# Patient Record
Sex: MALE | Race: WHITE
[De-identification: names, ages, dates, MRNs, and addresses within clinical notes are randomized per-mention and may not be internally consistent; named-entity substitution may affect disease eponyms.]

---

## 2017-04-21 ENCOUNTER — HOSPITAL ENCOUNTER (OUTPATIENT)
Dept: HOSPITAL 17 - ELAB | Age: 77
End: 2017-04-21
Attending: ORTHOPAEDIC SURGERY
Payer: MEDICARE

## 2017-04-21 DIAGNOSIS — M06.4: ICD-10-CM

## 2017-04-21 DIAGNOSIS — N40.1: Primary | ICD-10-CM

## 2017-04-21 LAB
BASOPHILS # BLD AUTO: 0.1 TH/MM3 (ref 0–0.2)
BASOPHILS NFR BLD: 0.8 % (ref 0–2)
EOSINOPHIL # BLD: 0.5 TH/MM3 (ref 0–0.4)
EOSINOPHIL NFR BLD: 5.3 % (ref 0–4)
ERYTHROCYTE [DISTWIDTH] IN BLOOD BY AUTOMATED COUNT: 12.8 % (ref 11.6–17.2)
ERYTHROCYTE [SEDIMENTATION RATE] IN BLOOD BY WESTERGREN METHOD: 47 MM/HR (ref 0–20)
HCT VFR BLD CALC: 40 % (ref 39–51)
HEMO FLAGS: (no result)
LYMPHOCYTES # BLD AUTO: 1.8 TH/MM3 (ref 1–4.8)
LYMPHOCYTES NFR BLD AUTO: 19 % (ref 9–44)
MCH RBC QN AUTO: 30.1 PG (ref 27–34)
MCHC RBC AUTO-ENTMCNC: 33.4 % (ref 32–36)
MCV RBC AUTO: 90 FL (ref 80–100)
MONOCYTES NFR BLD: 11.3 % (ref 0–8)
NEUTROPHILS # BLD AUTO: 6.1 TH/MM3 (ref 1.8–7.7)
NEUTROPHILS NFR BLD AUTO: 63.6 % (ref 16–70)
PLATELET # BLD: 296 TH/MM3 (ref 150–450)
RBC # BLD AUTO: 4.44 MIL/MM3 (ref 4.5–5.9)
URATE SERPL-MCNC: 3.6 MG/DL (ref 2.6–7.2)
WBC # BLD AUTO: 9.6 TH/MM3 (ref 4–11)

## 2017-04-21 PROCEDURE — 86140 C-REACTIVE PROTEIN: CPT

## 2017-04-21 PROCEDURE — 84153 ASSAY OF PSA TOTAL: CPT

## 2017-04-21 PROCEDURE — 36415 COLL VENOUS BLD VENIPUNCTURE: CPT

## 2017-04-21 PROCEDURE — 84550 ASSAY OF BLOOD/URIC ACID: CPT

## 2017-04-21 PROCEDURE — 85652 RBC SED RATE AUTOMATED: CPT

## 2017-04-21 PROCEDURE — 85025 COMPLETE CBC W/AUTO DIFF WBC: CPT

## 2017-12-05 ENCOUNTER — HOSPITAL ENCOUNTER (INPATIENT)
Dept: HOSPITAL 17 - NEPC | Age: 77
LOS: 10 days | Discharge: HOME HEALTH | DRG: 920 | End: 2017-12-15
Attending: FAMILY MEDICINE | Admitting: FAMILY MEDICINE
Payer: MEDICARE

## 2017-12-05 VITALS
HEART RATE: 65 BPM | SYSTOLIC BLOOD PRESSURE: 184 MMHG | RESPIRATION RATE: 15 BRPM | OXYGEN SATURATION: 99 % | DIASTOLIC BLOOD PRESSURE: 88 MMHG

## 2017-12-05 VITALS
HEART RATE: 73 BPM | RESPIRATION RATE: 16 BRPM | TEMPERATURE: 96.7 F | OXYGEN SATURATION: 99 % | DIASTOLIC BLOOD PRESSURE: 80 MMHG | SYSTOLIC BLOOD PRESSURE: 153 MMHG

## 2017-12-05 VITALS
OXYGEN SATURATION: 99 % | HEART RATE: 68 BPM | DIASTOLIC BLOOD PRESSURE: 81 MMHG | RESPIRATION RATE: 16 BRPM | SYSTOLIC BLOOD PRESSURE: 175 MMHG

## 2017-12-05 VITALS — HEIGHT: 72 IN | WEIGHT: 195.99 LBS | BODY MASS INDEX: 26.55 KG/M2

## 2017-12-05 DIAGNOSIS — K80.50: ICD-10-CM

## 2017-12-05 DIAGNOSIS — I10: ICD-10-CM

## 2017-12-05 DIAGNOSIS — K91.840: Primary | ICD-10-CM

## 2017-12-05 DIAGNOSIS — Z87.891: ICD-10-CM

## 2017-12-05 DIAGNOSIS — E78.5: ICD-10-CM

## 2017-12-05 DIAGNOSIS — K64.8: ICD-10-CM

## 2017-12-05 DIAGNOSIS — K57.30: ICD-10-CM

## 2017-12-05 DIAGNOSIS — Z95.1: ICD-10-CM

## 2017-12-05 DIAGNOSIS — K21.9: ICD-10-CM

## 2017-12-05 DIAGNOSIS — E87.6: ICD-10-CM

## 2017-12-05 DIAGNOSIS — D64.9: ICD-10-CM

## 2017-12-05 DIAGNOSIS — I25.2: ICD-10-CM

## 2017-12-05 DIAGNOSIS — Z88.8: ICD-10-CM

## 2017-12-05 DIAGNOSIS — K63.5: ICD-10-CM

## 2017-12-05 DIAGNOSIS — I25.10: ICD-10-CM

## 2017-12-05 DIAGNOSIS — Z79.82: ICD-10-CM

## 2017-12-05 DIAGNOSIS — K63.3: ICD-10-CM
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
## 2017-12-05 DIAGNOSIS — Z91.040: ICD-10-CM

## 2017-12-05 DIAGNOSIS — K82.8: ICD-10-CM

## 2017-12-05 LAB
ANION GAP SERPL CALC-SCNC: 6 MEQ/L (ref 5–15)
BASOPHILS # BLD AUTO: 0.1 TH/MM3 (ref 0–0.2)
BASOPHILS NFR BLD: 1 % (ref 0–2)
BUN SERPL-MCNC: 10 MG/DL (ref 7–18)
CHLORIDE SERPL-SCNC: 103 MEQ/L (ref 98–107)
EOSINOPHIL # BLD: 0.5 TH/MM3 (ref 0–0.4)
EOSINOPHIL NFR BLD: 7.4 % (ref 0–4)
ERYTHROCYTE [DISTWIDTH] IN BLOOD BY AUTOMATED COUNT: 13 % (ref 11.6–17.2)
GFR SERPLBLD BASED ON 1.73 SQ M-ARVRAT: 93 ML/MIN (ref 89–?)
HCO3 BLD-SCNC: 29.9 MEQ/L (ref 21–32)
HCT VFR BLD CALC: 46.2 % (ref 39–51)
HEMO FLAGS: (no result)
INR PPP: 1 RATIO
LYMPHOCYTES # BLD AUTO: 3.1 TH/MM3 (ref 1–4.8)
LYMPHOCYTES NFR BLD AUTO: 41.7 % (ref 9–44)
MCH RBC QN AUTO: 30.9 PG (ref 27–34)
MCHC RBC AUTO-ENTMCNC: 34 % (ref 32–36)
MCV RBC AUTO: 91 FL (ref 80–100)
MONOCYTES NFR BLD: 9.8 % (ref 0–8)
NEUTROPHILS # BLD AUTO: 3 TH/MM3 (ref 1.8–7.7)
NEUTROPHILS NFR BLD AUTO: 40.1 % (ref 16–70)
PLATELET # BLD: 288 TH/MM3 (ref 150–450)
POTASSIUM SERPL-SCNC: 4.1 MEQ/L (ref 3.5–5.1)
PROTHROMBIN TIME: 10 SEC (ref 9.8–11.6)
RBC # BLD AUTO: 5.08 MIL/MM3 (ref 4.5–5.9)
SODIUM SERPL-SCNC: 139 MEQ/L (ref 136–145)
WBC # BLD AUTO: 7.4 TH/MM3 (ref 4–11)

## 2017-12-05 PROCEDURE — 87493 C DIFF AMPLIFIED PROBE: CPT

## 2017-12-05 PROCEDURE — 82150 ASSAY OF AMYLASE: CPT

## 2017-12-05 PROCEDURE — 78278 ACUTE GI BLOOD LOSS IMAGING: CPT

## 2017-12-05 PROCEDURE — 86850 RBC ANTIBODY SCREEN: CPT

## 2017-12-05 PROCEDURE — 71111 X-RAY EXAM RIBS/CHEST4/> VWS: CPT

## 2017-12-05 PROCEDURE — 85018 HEMOGLOBIN: CPT

## 2017-12-05 PROCEDURE — 86901 BLOOD TYPING SEROLOGIC RH(D): CPT

## 2017-12-05 PROCEDURE — 85025 COMPLETE CBC W/AUTO DIFF WBC: CPT

## 2017-12-05 PROCEDURE — 74330 X-RAY BILE/PANC ENDOSCOPY: CPT

## 2017-12-05 PROCEDURE — 80048 BASIC METABOLIC PNL TOTAL CA: CPT

## 2017-12-05 PROCEDURE — 80076 HEPATIC FUNCTION PANEL: CPT

## 2017-12-05 PROCEDURE — P9016 RBC LEUKOCYTES REDUCED: HCPCS

## 2017-12-05 PROCEDURE — 80053 COMPREHEN METABOLIC PANEL: CPT

## 2017-12-05 PROCEDURE — 86900 BLOOD TYPING SEROLOGIC ABO: CPT

## 2017-12-05 PROCEDURE — 85014 HEMATOCRIT: CPT

## 2017-12-05 PROCEDURE — 86920 COMPATIBILITY TEST SPIN: CPT

## 2017-12-05 PROCEDURE — 74181 MRI ABDOMEN W/O CONTRAST: CPT

## 2017-12-05 PROCEDURE — 74177 CT ABD & PELVIS W/CONTRAST: CPT

## 2017-12-05 PROCEDURE — 82378 CARCINOEMBRYONIC ANTIGEN: CPT

## 2017-12-05 PROCEDURE — 36430 TRANSFUSION BLD/BLD COMPNT: CPT

## 2017-12-05 PROCEDURE — 85027 COMPLETE CBC AUTOMATED: CPT

## 2017-12-05 PROCEDURE — 85007 BL SMEAR W/DIFF WBC COUNT: CPT

## 2017-12-05 PROCEDURE — A9560 TC99M LABELED RBC: HCPCS

## 2017-12-05 PROCEDURE — 85610 PROTHROMBIN TIME: CPT

## 2017-12-05 PROCEDURE — 76377 3D RENDER W/INTRP POSTPROCES: CPT

## 2017-12-05 PROCEDURE — 83690 ASSAY OF LIPASE: CPT

## 2017-12-05 PROCEDURE — 93005 ELECTROCARDIOGRAM TRACING: CPT

## 2017-12-05 NOTE — PD
HPI


Chief Complaint:  GI Complaint


Time Seen by Provider:  22:25


Travel History


International Travel<30 days:  No


Contact w/Intl Traveler<30days:  No


Traveled to known affect area:  No





History of Present Illness


HPI


77-year-old male came to the emergency room with history of hematochezia that 

started at 6:45 PM.  Patient says that he had 10 polyps taken out during a 

colonoscopy about one week ago by Dr. Talavera.  He started having some abdominal 

discomfort 2 days later and was started on ciprofloxacin.  However when the 

bleeding started today he called the GI office but did not hear back from 

anybody.  In next one hour he had 3 more such episodes.  Patient describes 

these bleeding as big clots being passed.  History of point tenderness.  Just 

generalized discomfort in the abdomen but no point tenderness.  No history of 

vomiting or nausea.  Vital signs were stable.  Patient denies any weakness or 

dizziness.  No syncopal episode.





PFSH


Past Medical History


*** Narrative Medical


List of his past medical, surgical, social and family history is reviewed from 

the nursing note.


Cardiac Catheterization:  Yes


High Cholesterol:  Yes


Coronary Artery Disease:  Yes


Hypertension:  Yes


Immunizations Current:  Yes


Myocardial Infarction:  Yes (x 2)


Tetanus Vaccination:  Unknown


Influenza Vaccination:  Yes





Past Surgical History


Coronary Artery Bypass Graft:  Yes


Genitourinary Surgery:  Yes (turp)


Other Surgery:  Yes (pacemaker)





Social History


Alcohol Use:  No


Tobacco Use:  No


Substance Use:  No





Allergies-Medications


(Allergen,Severity, Reaction):  


Coded Allergies:  


     hydromorphone (Unverified  Allergy, Severe, HALLUCINATIONS; AGITATION, 12/5 /17)


     latex (Unverified  Adverse Reaction, Severe, SKIN IRRITATION, 12/5/17)


     metoprolol (Unverified  Adverse Reaction, Severe, FORGETS, BUT AVOIDS, 12/5 /17)


Comments


List of his allergies reviewed from the nursing note.


Reported Meds & Prescriptions





Reported Meds & Active Scripts


Active


Reported


Carvedilol 6.25 Mg Tab 6.25 Mg PO TID


Prevacid (Lansoprazole) 30 Mg Capdr 30 Mg PO DAILY


Vytorin (Ezetimibe-Simvastatin) 10-20 Mg Tab 1 Tab PO HS





Narrative Medication


List of his home medications reviewed from the nursing note.





Review of Systems


Except as stated in HPI:  all other systems reviewed are Neg


Gastrointestinal:  Positive: Hematochezia





Physical Exam


Narrative


GENERAL: Awake, alert, anxious, mild distress


SKIN: Focused skin assessment warm/dry.


HEAD: Atraumatic. Normocephalic. 


EYES: Pupils equal and round. No scleral icterus. No injection or drainage. 


ENT: No nasal bleeding or discharge.  Mucous membranes pink and moist.


NECK: Trachea midline. No JVD. 


CARDIOVASCULAR: Regular rate and rhythm.  No murmur appreciated.


RESPIRATORY: No accessory muscle use. Clear to auscultation. Breath sounds 

equal bilaterally. 


GASTROINTESTINAL: Abdomen soft, non-tender, nondistended. Hepatic and splenic 

margins not palpable. 


MUSCULOSKELETAL: No obvious deformities. No clubbing.  No cyanosis.  No edema. 


NEUROLOGICAL: Awake and alert. No obvious cranial nerve deficits.  Motor 

grossly within normal limits. Normal speech.


PSYCHIATRIC: Appropriate mood and affect; insight and judgment normal.





Data


Data


Last Documented VS





Orders





 Orders


Basic Metabolic Panel (Bmp) (12/5/17 23:04)


Complete Blood Count With Diff (12/5/17 23:04)


Prothrombin Time / Inr (Pt) (12/5/17 23:04)


Type And Screen (12/5/17 23:04)


Ecg Monitoring (12/5/17 23:04)


Iv Access Insert/Monitor (12/5/17 23:04)


Oximetry (12/5/17 23:04)


Sodium Chlor 0.9% 1000 Ml Inj (Ns 1000 M (12/5/17 23:04)


Sodium Chloride 0.9% Flush (Ns Flush) (12/5/17 23:15)


Admit Order (Ed Use Only) (12/6/17 00:03)





Labs





Laboratory Tests








Test


  12/5/17


23:20


 


White Blood Count 7.4 TH/MM3 


 


Red Blood Count 5.08 MIL/MM3 


 


Hemoglobin 15.7 GM/DL 


 


Hematocrit 46.2 % 


 


Mean Corpuscular Volume 91.0 FL 


 


Mean Corpuscular Hemoglobin 30.9 PG 


 


Mean Corpuscular Hemoglobin


Concent 34.0 % 


 


 


Red Cell Distribution Width 13.0 % 


 


Platelet Count 288 TH/MM3 


 


Mean Platelet Volume 8.7 FL 


 


Neutrophils (%) (Auto) 40.1 % 


 


Lymphocytes (%) (Auto) 41.7 % 


 


Monocytes (%) (Auto) 9.8 % 


 


Eosinophils (%) (Auto) 7.4 % 


 


Basophils (%) (Auto) 1.0 % 


 


Neutrophils # (Auto) 3.0 TH/MM3 


 


Lymphocytes # (Auto) 3.1 TH/MM3 


 


Monocytes # (Auto) 0.7 TH/MM3 


 


Eosinophils # (Auto) 0.5 TH/MM3 


 


Basophils # (Auto) 0.1 TH/MM3 


 


CBC Comment DIFF FINAL 


 


Differential Comment  


 


Prothrombin Time 10.0 SEC 


 


Prothromb Time International


Ratio 1.0 RATIO 


 


 


Blood Urea Nitrogen 10 MG/DL 


 


Creatinine 0.81 MG/DL 


 


Random Glucose 92 MG/DL 


 


Calcium Level 8.8 MG/DL 


 


Sodium Level 139 MEQ/L 


 


Potassium Level 4.1 MEQ/L 


 


Chloride Level 103 MEQ/L 


 


Carbon Dioxide Level 29.9 MEQ/L 


 


Anion Gap 6 MEQ/L 


 


Estimat Glomerular Filtration


Rate 93 ML/MIN 


 











MDM


Medical Decision Making


Medical Screen Exam Complete:  Yes


Emergency Medical Condition:  Yes


Medical Record Reviewed:  Yes


Differential Diagnosis


Lower GI bleed


Narrative Course


12:08 AM blood test results of back and H&H are stable.  I have put a call out 

for GI for Dr. Talavera or whoever is covering for him.  I have not heard back 

yet.  I have admitted the patient to his primary care physician.  Patient is 

getting 1 L of IV fluid bolus.





12:27 AM I discussed the case with the GI specialist who was on call and he 

recommended octreotide bolus and a drip which has been ordered.





Procedures


EKG Prior to Arrival:  No





HemaPrompt Point of Care


Internal Pos. & Neg. Controls:  Passed


Fecal Specimen Occult Blood:  Positive


Comment


Gross blood on the gloved finger





Diagnosis





 Primary Impression:  


 Hematochezia





Admitting Information


Admitting Physician Requests:  Admit











Rachelle Rosario MD Dec 5, 2017 22:26

## 2017-12-06 VITALS
RESPIRATION RATE: 17 BRPM | SYSTOLIC BLOOD PRESSURE: 147 MMHG | TEMPERATURE: 97.9 F | OXYGEN SATURATION: 96 % | HEART RATE: 74 BPM | DIASTOLIC BLOOD PRESSURE: 70 MMHG

## 2017-12-06 VITALS
DIASTOLIC BLOOD PRESSURE: 67 MMHG | HEART RATE: 75 BPM | RESPIRATION RATE: 12 BRPM | OXYGEN SATURATION: 97 % | SYSTOLIC BLOOD PRESSURE: 151 MMHG

## 2017-12-06 VITALS
DIASTOLIC BLOOD PRESSURE: 75 MMHG | SYSTOLIC BLOOD PRESSURE: 134 MMHG | OXYGEN SATURATION: 96 % | HEART RATE: 81 BPM | TEMPERATURE: 98.3 F | RESPIRATION RATE: 18 BRPM

## 2017-12-06 VITALS
OXYGEN SATURATION: 94 % | SYSTOLIC BLOOD PRESSURE: 109 MMHG | HEART RATE: 72 BPM | TEMPERATURE: 98.5 F | DIASTOLIC BLOOD PRESSURE: 56 MMHG | RESPIRATION RATE: 18 BRPM

## 2017-12-06 VITALS
TEMPERATURE: 97.7 F | DIASTOLIC BLOOD PRESSURE: 87 MMHG | SYSTOLIC BLOOD PRESSURE: 191 MMHG | HEART RATE: 71 BPM | RESPIRATION RATE: 18 BRPM | OXYGEN SATURATION: 97 %

## 2017-12-06 VITALS — HEART RATE: 71 BPM

## 2017-12-06 VITALS
DIASTOLIC BLOOD PRESSURE: 83 MMHG | HEART RATE: 66 BPM | SYSTOLIC BLOOD PRESSURE: 165 MMHG | TEMPERATURE: 98.1 F | RESPIRATION RATE: 18 BRPM | OXYGEN SATURATION: 98 %

## 2017-12-06 VITALS — HEART RATE: 68 BPM

## 2017-12-06 LAB
BASOPHILS # BLD AUTO: 0.1 TH/MM3 (ref 0–0.2)
BASOPHILS NFR BLD: 1.1 % (ref 0–2)
EOSINOPHIL # BLD: 0.1 TH/MM3 (ref 0–0.4)
EOSINOPHIL NFR BLD: 1.6 % (ref 0–4)
ERYTHROCYTE [DISTWIDTH] IN BLOOD BY AUTOMATED COUNT: 13 % (ref 11.6–17.2)
HCT VFR BLD CALC: 29.3 % (ref 39–51)
HEMO FLAGS: (no result)
LYMPHOCYTES # BLD AUTO: 2 TH/MM3 (ref 1–4.8)
LYMPHOCYTES NFR BLD AUTO: 36.3 % (ref 9–44)
MCH RBC QN AUTO: 31.9 PG (ref 27–34)
MCHC RBC AUTO-ENTMCNC: 34.8 % (ref 32–36)
MCV RBC AUTO: 91.8 FL (ref 80–100)
MONOCYTES NFR BLD: 8 % (ref 0–8)
NEUTROPHILS # BLD AUTO: 2.9 TH/MM3 (ref 1.8–7.7)
NEUTROPHILS NFR BLD AUTO: 53 % (ref 16–70)
PLATELET # BLD: 225 TH/MM3 (ref 150–450)
RBC # BLD AUTO: 3.2 MIL/MM3 (ref 4.5–5.9)
WBC # BLD AUTO: 5.5 TH/MM3 (ref 4–11)

## 2017-12-06 RX ADMIN — OCTREOTIDE ACETATE SCH MLS/HR: 1000 INJECTION, SOLUTION INTRAVENOUS; SUBCUTANEOUS at 02:16

## 2017-12-06 RX ADMIN — OCTREOTIDE ACETATE SCH MLS/HR: 1000 INJECTION, SOLUTION INTRAVENOUS; SUBCUTANEOUS at 21:41

## 2017-12-06 RX ADMIN — EZETIMIBE SCH MG: 10 TABLET ORAL at 21:41

## 2017-12-06 RX ADMIN — ACETAMINOPHEN PRN MG: 325 TABLET ORAL at 13:47

## 2017-12-06 RX ADMIN — PANTOPRAZOLE SCH MG: 40 TABLET, DELAYED RELEASE ORAL at 08:15

## 2017-12-06 RX ADMIN — CARVEDILOL SCH MG: 6.25 TABLET, FILM COATED ORAL at 12:05

## 2017-12-06 RX ADMIN — CARVEDILOL SCH MG: 6.25 TABLET, FILM COATED ORAL at 08:14

## 2017-12-06 RX ADMIN — PRAVASTATIN SODIUM SCH MG: 40 TABLET ORAL at 21:41

## 2017-12-06 RX ADMIN — Medication SCH ML: at 21:00

## 2017-12-06 RX ADMIN — Medication SCH ML: at 08:14

## 2017-12-06 RX ADMIN — CARVEDILOL SCH MG: 6.25 TABLET, FILM COATED ORAL at 17:45

## 2017-12-06 RX ADMIN — OCTREOTIDE ACETATE SCH MLS/HR: 1000 INJECTION, SOLUTION INTRAVENOUS; SUBCUTANEOUS at 12:35

## 2017-12-06 NOTE — HHI.HP
History of Present Illness


Service


Medicine


Primary Care Physician


Jose A Mendenhall, DO


Admission Diagnosis





lower GI bleed


Diagnoses:  


(1) GI bleed


(2) Abdominal pain


History of Present Illness


77-year-old male came to the emergency room with history of hematochezia that 

started at 6:45 PM.  Patient says that he had 10 polyps taken out during a 

colonoscopy about one week ago by Dr. Talavera.  He started having some abdominal 

discomfort 2 days later and was started on ciprofloxacin.  However when the 

bleeding started today he called the GI office but did not hear back from 

anybody.  In next one hour he had 3 more such episodes.  Patient describes 

these bleeding as big clots being passed.  History of point tenderness.  Just 

generalized discomfort in the abdomen but no point tenderness.





Review of Systems


Respiratory:  DENIES: Hemoptysis, Shortness of breath


Cardiovascular:  DENIES: Chest pain, Palpitations


Gastrointestinal:  COMPLAINS OF: Abdominal pain, Bloody stools


Neurologic:  DENIES: Headache, Seizures


Psychiatric:  DENIES: Anxiety, Confusion





Past Family Social History


Allergies:  


Coded Allergies:  


     hydromorphone (Unverified  Allergy, Severe, HALLUCINATIONS; AGITATION, )


     latex (Unverified  Adverse Reaction, Severe, SKIN IRRITATION, 17)


     metoprolol (Unverified  Adverse Reaction, Severe, FORGETS, BUT AVOIDS, )


Past Medical History





CAD


HLD


HTN


Past Surgical History











Coronary Artery Bypass Graft:  Yes


Genitourinary Surgery:  Yes (turp)


Other Surgery:  Yes (pacemaker)


Active Ordered Medications





Current Medications








 Medications


  (Trade)  Dose


 Ordered  Sig/Lawrence


 Route  Start Time


 Stop Time Status Last Admin


 


  (NS Flush)  2 ml  UNSCH  PRN


 IV FLUSH  17 00:15


     


 


 


  (NS Flush)  2 ml  BID


 IV FLUSH  17 09:00


     


 


 


  (Narcan Inj)  0.4 mg  UNSCH  PRN


 IV PUSH  17 00:15


     


 


 


  (Coreg)  6.25 mg  TID


 PO  17 09:00


    17 08:14


 


 


  (Protonix)  40 mg  DAILY


 PO  17 09:00


    17 08:15


 


 


 Octreotide


 Acetate 500 mcg/


 Sodium Chloride  500 ml @ 


 50 mls/hr  Q10H


 IV  17 00:26


    17 02:16


 


 


  (Zetia)  10 mg  HS


 PO  17 21:00


     


 


 


  (Pravachol)  40 mg  HS


 PO  17 21:00


     


 








Family History


Mother with history of lung cancer


Father heart disease


Social History


Denies smoking or ETOH use


Lives with wife


retired





Physical Exam


Vital Signs





Vital Signs








  Date Time  Temp Pulse Resp B/P (MAP) Pulse Ox O2 Delivery O2 Flow Rate FiO2


 


17 07:42 98.1 66 18 165/83 (110) 98   


 


17 07:29  68      


 


17 03:55        


 


17 03:22 97.9 74 17 147/70 (95) 96   


 


17 01:46  75 12 151/67 (95) 97 Room Air  


 


17 23:56  68 16 175/81 (112) 99 Room Air  


 


17 23:22  65 15 184/88 (120) 99 Room Air  


 


17 21:40 96.7 73 16 153/80 (104) 99 Room Air  








Physical Exam


GENERAL: This is a well-nourished, well-developed patient, in no apparent 

distress.


SKIN: No rashes, ecchymoses or lesions. Cool and dry.


HEAD: Atraumatic. Normocephalic. No temporal or scalp tenderness.


EYES: Pupils equal round and reactive. Extraocular motions intact. No scleral 

icterus. No injection or drainage. 


ENT: Nose without bleeding, purulent drainage or septal hematoma. Throat 

without erythema, tonsillar hypertrophy or exudate. Uvula midline. Airway 

patent.


NECK: Trachea midline. No JVD or lymphadenopathy. Supple, nontender, no 

meningeal signs.


CARDIOVASCULAR: Regular rate and rhythm without murmurs, gallops, or rubs. 


RESPIRATORY: Clear to auscultation. Breath sounds equal bilaterally. No wheezes

, rales, or rhonchi.  


GASTROINTESTINAL: Abdomen soft, non-tender, nondistended. No hepato-splenomegaly

, or palpable masses. No guarding.


MUSCULOSKELETAL: Extremities without clubbing, cyanosis, or edema. No joint 

tenderness, effusion, or edema noted. No calf tenderness. Negative Homans sign 

bilaterally.


NEUROLOGICAL: Awake and alert. Cranial nerves II through XII intact.  Motor and 

sensory grossly within normal limits. Five out of 5 muscle strength in all 

muscle groups.  Normal speech.


Laboratory





Laboratory Tests








Test


  17


23:20


 


White Blood Count 7.4 


 


Red Blood Count 5.08 


 


Hemoglobin 15.7 


 


Hematocrit 46.2 


 


Mean Corpuscular Volume 91.0 


 


Mean Corpuscular Hemoglobin 30.9 


 


Mean Corpuscular Hemoglobin


Concent 34.0 


 


 


Red Cell Distribution Width 13.0 


 


Platelet Count 288 


 


Mean Platelet Volume 8.7 


 


Neutrophils (%) (Auto) 40.1 


 


Lymphocytes (%) (Auto) 41.7 


 


Monocytes (%) (Auto) 9.8 


 


Eosinophils (%) (Auto) 7.4 


 


Basophils (%) (Auto) 1.0 


 


Neutrophils # (Auto) 3.0 


 


Lymphocytes # (Auto) 3.1 


 


Monocytes # (Auto) 0.7 


 


Eosinophils # (Auto) 0.5 


 


Basophils # (Auto) 0.1 


 


CBC Comment DIFF FINAL 


 


Differential Comment  


 


Prothrombin Time 10.0 


 


Prothromb Time International


Ratio 1.0 


 


 


Blood Urea Nitrogen 10 


 


Creatinine 0.81 


 


Random Glucose 92 


 


Calcium Level 8.8 


 


Sodium Level 139 


 


Potassium Level 4.1 


 


Chloride Level 103 


 


Carbon Dioxide Level 29.9 


 


Anion Gap 6 


 


Estimat Glomerular Filtration


Rate 93 


 








Result Diagram:  


17








Caprini VTE Risk Assessment


Caprini VTE Risk Assessment:  No/Low Risk (score <= 1)


VTE Pharm Contraindication:  Active bleeding


Caprini Risk Assessment Model











 Point Value = 1          Point Value = 2  Point Value = 3  Point Value = 5


 


Age 41-60


Minor surgery


BMI > 25 kg/m2


Swollen legs


Varicose veins


Pregnancy or postpartum


History of unexplained or recurrent


   spontaneous 


Oral contraceptives or hormone


   replacement


Sepsis (< 1 month)


Serious lung disease, including


   pneumonia (< 1 month)


Abnormal pulmonary function


Acute myocardial infarction


Congestive heart failure (< 1 month)


History of inflammatory bowel disease


Medical patient at bed rest Age 61-74


Arthroscopic surgery


Major open surgery (> 45 min)


Laparoscopic surgery (> 45 min)


Malignancy


Confined to bed (> 72 hours)


Immobilizing plaster cast


Central venous access Age >= 75


History of VTE


Family history of VTE


Factor V Leiden


Prothrombin 80559P


Lupus anticoagulant


Anticardiolipin antibodies


Elevated serum homocysteine


Heparin-induced thrombocytopenia


Other congenital or acquired


   thrombophilia Stroke (< 1 month)


Elective arthroplasty


Hip, pelvis, or leg fracture


Acute spinal cord injury (< 1 month)








Prophylaxis Regimen











   Total Risk


Factor Score Risk Level Prophylaxis Regimen


 


0-1      Low Early ambulation


 


2 Moderate Order ONE of the following:


*Sequential Compression Device (SCD)


*Heparin 5000 units SQ BID


 


3-4 Higher Order ONE of the following medications:


*Heparin 5000 units SQ TID


*Enoxaparin/Lovenox 40 mg SQ daily (WT < 150 kg, CrCl > 30 mL/min)


*Enoxaparin/Lovenox 30 mg SQ daily (WT < 150 kg, CrCl > 10-29 mL/min)


*Enoxaparin/Lovenox 30 mg SQ BID (WT < 150 kg, CrCl > 30 mL/min)


AND/OR


*Sequential Compression Device (SCD)


 


5 or more Highest Order ONE of the following medications:


*Heparin 5000 units SQ TID (Preferred with Epidurals)


*Enoxaparin/Lovenox 40 mg SQ daily (WT < 150 kg, CrCl > 30 mL/min)


*Enoxaparin/Lovenox 30 mg SQ daily (WT < 150 kg, CrCl > 10-29 mL/min)


*Enoxaparin/Lovenox 30 mg SQ BID (WT < 150 kg, CrCl > 30 mL/min)


AND


*Sequential Compression Device (SCD)











Assessment and Plan


Problem List:  


(1) Hematochezia


ICD Codes:  K92.1 - Melena


Status:  Acute


(2) Abdominal pain


ICD Codes:  R10.9 - Unspecified abdominal pain


Assessment and Plan


17


Hematochezia:  GI consulted.  Clear liquid diet and sandostatin drip.  Frequent 

bloody stools.  Hemoglobin is stable at 15.7.  Colonscopy done 1 week ago with 

over 10 polyps removed.  Has had abdominal discomfort since that point was 

started on ciprofloxacin outpatient.  


HTN;  continue home medications well controlled on coreg


HLD:  continue home medications


Labs in am





I and the ARNP have both examined this patient and reviewed this note and I 

agree with these findings and plan of care.  Gerald R Woodard DO Gellermann,Diane M. ARNP Dec 6, 2017 09:44

## 2017-12-06 NOTE — MB
cc:

SP FAIRCHILD MD,ALEXANDRE CONNELLY D.O., M.D.

****

 

 

DATE OF CONSULTATION:  2017

 

YOB: 1940

 

REASON FOR CONSULTATION

I was asked to see this patient in consultation by Dr. Mendenhall for evaluation

of GI bleed.

 

I saw the patient at about 10:45 this morning but was unable to dictate until

now.

 

HISTORY OF PRESENT ILLNESS

The patient is a pleasant 77-year-old white male with a history of multiple

advanced colon polyps.  He had a colonoscopy done on  and the

patient had a polyp removed from the cecum, ascending colon, transverse colon

as well as descending colon.  There was also scattered diverticula throughout

the colon.  The polyps were removed via hot polypectomy technique.  There was

also a polyp in the ascending colon which could not be removed.  A day or so

after the procedure complained of right lower quadrant pain and thought he may

have some form of post-polypectomy syndrome.  He was given Cipro and this

resolved his pain.  Unfortunately yesterday early he had multiple episodes of

bright red blood per rectum.  He became light-headed, somewhat dizzy and he

came to the emergency room.  Hemoglobin was 15.7.  At the time I saw him in the

emergency room he was still passing bright red blood per rectum.

 

He denies any dysphagia, odynophagia, nausea or vomiting.  No melena.  His

abdominal pain has resolved.  No fever or chills.  He does take aspirin as well

as Celebrex but no other blood thinners.

 

PAST MEDICAL HISTORY

1. Adenomatous polyps and serrated adenomas.

2. Coronary artery disease.

3. Gastroesophageal reflux disease.

4. Hypertension.

5. Dyslipidemia.

 

PAST SURGICAL HISTORY

1. TURP.

2. Coronary artery bypass graft.

3. Some type of neck surgery.

4. Possible pacemaker.

 

ALLERGIES

1. LATEX.

2. METOPROLOL.

3. HYDROMORPHONE.

 

FAMILY HISTORY

Significant for coronary artery disease but no colon cancer or colon polyps.

 

SOCIAL HISTORY

He is a former smoker, stopped drinking in .

 

REVIEW OF SYSTEMS

CONSTITUTIONAL:  No weight loss, fever or chills.

CARDIOPULMONARY:  No chest pain, palpitations, wheezing or SOB

GASTROINTESTINAL:  Please see above.

Otherwise unremarkable 10-point review of systems.

 

MEDICATIONS

Medications at this time include:

1. Zetia.

2. Pravachol.

3. Tylenol.

4. Catapres.

5. Norvasc.

6. Coreg.

7. Protonix.

8. Narcan.

 

As an outpatient he has been gettin. Aspirin.

2. Celebrex.

3. Caltrate.

4. Vytorin.

5. Lansoprazole.

6. Carvedilol.

7. Flonase.

8. Gas-X.

 

PHYSICAL EXAMINATION

VITAL SIGNS:  Blood pressure when I first saw him this morning was 165/83,

pulse 66, respiratory rate 18, temperature 98.1.

GENERAL:  He is a well-developed, well-nourished white male who appears in no

acute distress, although he did have a bowel motion which showed bright red

blood per rectum.  He did not appear to be orthostatic.

NECK:  Supple.  No thyromegaly or lymphadenopathy.

HEENT:  Pupils equal, round and reactive to light.  No scleral icterus.

Oropharyngeal cavity has dental caries.  No tongue deviation or candidal

lesion.  Hearing is intact.

LUNGS:  Clear to auscultation and percussion.

HEART:  Regular rhythm.  No gross murmurs heard.

ABDOMEN:  Soft, nondistended, nontender.  No organomegaly or masses.  No

ascites or hernias.  Bowel sounds positive in all quadrants.

RECTAL:  I did not do a rectal exam but the ER doctor did and bright red blood

was noted.

EXTREMITIES:  No clubbing, cyanosis or edema.

NEUROLOGIC:  Cranial nerves II through XII are grossly intact.

SKIN:  Warm and moist.

 

DATA BASE

Hemoglobin was 15.7, hematocrit 46.2, MCV 91, white blood count of 7400,

platelet count 288,000.

 

BUN 10, creatinine 0.81, potassium 4.1.

 

Prothrombin time is 10, INR 1.0.

 

IMPRESSION

1. Lower GI bleeding/hematochezia:  Patient may have a post-polypoid bleed,

   could also be diverticular bleeding.  The chance of upper GI bleed is low in

   the differential as there are no black stools and BUN and creatinine are

   normal.

2. History of colon polyps, multiple:  He had serrated adenomas; these are

   considered advanced polyps.  One polyp in the ascending could not be

   removed.  He had multiple polypectomies mentioned above.

3. Colonic diverticulosis scattered throughout.

4. Right lower quadrant pain resolved.  This might have been a post-polypectomy

   syndrome, a non-transmural burn.  It is better at this time.

 

RECOMMENDATIONS

1. Transfuse as needed.

2. Follow up labs.

3. Octreotide; this was suggested by myself to the ER physician last night.

4. At this point consideration for either a colonoscopy with rapid clean-out

   first, bleeding scan  or angiography.  We talked about the

   merits of all this testing.  The colonoscopy would involve another prep and

   he is somewhat hesitant about that.  He also understands that he has some

   right lower quadrant pain and there may be some thinning of the bowel wall

   and you worry about perforation.  He wants to avoid another colonoscopy and

   decided to go directly to arteriogram.  After discussion with radiology they

   suggested a stat. bleeding scan first (nuclear medicine bleeding scan)

   before they do an arteriogram.

5. Further recommendations depending on how he does.

 

 

 

                              _________________________________

                               Alexandre Camarena MD

 

 

 

SP/SUSSY

D:  2017/2:57 PM

T:  2017/3:13 PM

Visit #:  [id number]

Job #:  [id number]

ELISHA

## 2017-12-07 VITALS
RESPIRATION RATE: 18 BRPM | SYSTOLIC BLOOD PRESSURE: 134 MMHG | DIASTOLIC BLOOD PRESSURE: 62 MMHG | OXYGEN SATURATION: 100 % | HEART RATE: 61 BPM | TEMPERATURE: 96.4 F

## 2017-12-07 VITALS
DIASTOLIC BLOOD PRESSURE: 72 MMHG | HEART RATE: 60 BPM | SYSTOLIC BLOOD PRESSURE: 129 MMHG | TEMPERATURE: 96.8 F | OXYGEN SATURATION: 98 % | RESPIRATION RATE: 17 BRPM

## 2017-12-07 VITALS
DIASTOLIC BLOOD PRESSURE: 68 MMHG | HEART RATE: 60 BPM | RESPIRATION RATE: 18 BRPM | OXYGEN SATURATION: 100 % | TEMPERATURE: 96.6 F | SYSTOLIC BLOOD PRESSURE: 133 MMHG

## 2017-12-07 VITALS
RESPIRATION RATE: 18 BRPM | DIASTOLIC BLOOD PRESSURE: 56 MMHG | OXYGEN SATURATION: 98 % | TEMPERATURE: 97.8 F | HEART RATE: 60 BPM | SYSTOLIC BLOOD PRESSURE: 128 MMHG

## 2017-12-07 VITALS
OXYGEN SATURATION: 97 % | SYSTOLIC BLOOD PRESSURE: 135 MMHG | TEMPERATURE: 95.8 F | RESPIRATION RATE: 18 BRPM | HEART RATE: 70 BPM | DIASTOLIC BLOOD PRESSURE: 62 MMHG

## 2017-12-07 VITALS
TEMPERATURE: 95.2 F | OXYGEN SATURATION: 97 % | RESPIRATION RATE: 20 BRPM | DIASTOLIC BLOOD PRESSURE: 66 MMHG | SYSTOLIC BLOOD PRESSURE: 121 MMHG | HEART RATE: 114 BPM

## 2017-12-07 VITALS
DIASTOLIC BLOOD PRESSURE: 56 MMHG | RESPIRATION RATE: 18 BRPM | SYSTOLIC BLOOD PRESSURE: 104 MMHG | TEMPERATURE: 98.2 F | OXYGEN SATURATION: 98 % | HEART RATE: 64 BPM

## 2017-12-07 VITALS
OXYGEN SATURATION: 98 % | DIASTOLIC BLOOD PRESSURE: 56 MMHG | RESPIRATION RATE: 18 BRPM | HEART RATE: 60 BPM | TEMPERATURE: 97.8 F | SYSTOLIC BLOOD PRESSURE: 128 MMHG

## 2017-12-07 VITALS
SYSTOLIC BLOOD PRESSURE: 142 MMHG | DIASTOLIC BLOOD PRESSURE: 71 MMHG | RESPIRATION RATE: 18 BRPM | OXYGEN SATURATION: 100 % | TEMPERATURE: 95.6 F | HEART RATE: 68 BPM

## 2017-12-07 VITALS
DIASTOLIC BLOOD PRESSURE: 73 MMHG | RESPIRATION RATE: 17 BRPM | TEMPERATURE: 96.9 F | OXYGEN SATURATION: 99 % | HEART RATE: 67 BPM | SYSTOLIC BLOOD PRESSURE: 139 MMHG

## 2017-12-07 VITALS — HEART RATE: 73 BPM

## 2017-12-07 LAB
ALP SERPL-CCNC: 48 U/L (ref 45–117)
ALT SERPL-CCNC: 15 U/L (ref 12–78)
ANION GAP SERPL CALC-SCNC: 6 MEQ/L (ref 5–15)
AST SERPL-CCNC: 14 U/L (ref 15–37)
BASOPHILS # BLD AUTO: 0.1 TH/MM3 (ref 0–0.2)
BASOPHILS NFR BLD: 1 % (ref 0–2)
BILIRUB SERPL-MCNC: 0.3 MG/DL (ref 0.2–1)
BUN SERPL-MCNC: 11 MG/DL (ref 7–18)
CHLORIDE SERPL-SCNC: 109 MEQ/L (ref 98–107)
EOSINOPHIL # BLD: 0.2 TH/MM3 (ref 0–0.4)
EOSINOPHIL NFR BLD: 3.5 % (ref 0–4)
ERYTHROCYTE [DISTWIDTH] IN BLOOD BY AUTOMATED COUNT: 13 % (ref 11.6–17.2)
GFR SERPLBLD BASED ON 1.73 SQ M-ARVRAT: 113 ML/MIN (ref 89–?)
HCO3 BLD-SCNC: 29 MEQ/L (ref 21–32)
HCT VFR BLD CALC: 28.2 % (ref 39–51)
HEMO FLAGS: (no result)
INR PPP: 1.1 RATIO
LYMPHOCYTES # BLD AUTO: 2.3 TH/MM3 (ref 1–4.8)
LYMPHOCYTES NFR BLD AUTO: 35.4 % (ref 9–44)
MCH RBC QN AUTO: 30.7 PG (ref 27–34)
MCHC RBC AUTO-ENTMCNC: 33.8 % (ref 32–36)
MCV RBC AUTO: 90.7 FL (ref 80–100)
MONOCYTES NFR BLD: 9.8 % (ref 0–8)
NEUTROPHILS # BLD AUTO: 3.3 TH/MM3 (ref 1.8–7.7)
NEUTROPHILS NFR BLD AUTO: 50.3 % (ref 16–70)
PLATELET # BLD: 228 TH/MM3 (ref 150–450)
POTASSIUM SERPL-SCNC: 4.2 MEQ/L (ref 3.5–5.1)
PROTHROMBIN TIME: 10.8 SEC (ref 9.8–11.6)
RBC # BLD AUTO: 3.11 MIL/MM3 (ref 4.5–5.9)
SODIUM SERPL-SCNC: 144 MEQ/L (ref 136–145)
WBC # BLD AUTO: 6.5 TH/MM3 (ref 4–11)

## 2017-12-07 RX ADMIN — PANTOPRAZOLE SCH MG: 40 TABLET, DELAYED RELEASE ORAL at 09:19

## 2017-12-07 RX ADMIN — EZETIMIBE SCH MG: 10 TABLET ORAL at 19:59

## 2017-12-07 RX ADMIN — OCTREOTIDE ACETATE SCH MLS/HR: 1000 INJECTION, SOLUTION INTRAVENOUS; SUBCUTANEOUS at 16:14

## 2017-12-07 RX ADMIN — Medication SCH ML: at 09:00

## 2017-12-07 RX ADMIN — ACETAMINOPHEN PRN MG: 325 TABLET ORAL at 09:19

## 2017-12-07 RX ADMIN — CARVEDILOL SCH MG: 6.25 TABLET, FILM COATED ORAL at 16:09

## 2017-12-07 RX ADMIN — ACETAMINOPHEN PRN MG: 325 TABLET ORAL at 16:00

## 2017-12-07 RX ADMIN — CARVEDILOL SCH MG: 6.25 TABLET, FILM COATED ORAL at 09:19

## 2017-12-07 RX ADMIN — Medication SCH ML: at 20:10

## 2017-12-07 RX ADMIN — CARVEDILOL SCH MG: 6.25 TABLET, FILM COATED ORAL at 13:00

## 2017-12-07 RX ADMIN — LANSOPRAZOLE SCH MG: 30 TABLET, ORALLY DISINTEGRATING, DELAYED RELEASE ORAL at 19:58

## 2017-12-07 RX ADMIN — ACETAMINOPHEN PRN MG: 325 TABLET ORAL at 00:12

## 2017-12-07 RX ADMIN — PRAVASTATIN SODIUM SCH MG: 40 TABLET ORAL at 19:58

## 2017-12-07 RX ADMIN — OCTREOTIDE ACETATE SCH MLS/HR: 1000 INJECTION, SOLUTION INTRAVENOUS; SUBCUTANEOUS at 06:11

## 2017-12-07 RX ADMIN — SIMETHICONE CHEW TAB 80 MG PRN MG: 80 TABLET ORAL at 21:05

## 2017-12-07 NOTE — HHI.PR
Subjective


Remarks


Patient resting comfortably in bed.  Blood stools have improved since 

yesterday.  fall reported per nursing





Objective





Vital Signs








  Date Time  Temp Pulse Resp B/P (MAP) Pulse Ox O2 Delivery O2 Flow Rate FiO2


 


12/7/17 11:20 96.4 61 18 134/62 (86) 100   


 


12/7/17 09:20 95.8 70 18 135/62 (86) 97   


 


12/7/17 08:00 96.6 60 18 133/68 (89) 100   


 


12/7/17 04:15 96.9 67 17 139/73 (95) 99   


 


12/7/17 02:05 96.8 60 17 129/72 (91) 98   


 


12/7/17 00:37 98.2 64 18 104/56 (72) 98   


 


12/6/17 20:34 98.5 72 18 109/56 (73) 94   


 


12/6/17 13:32 98.3 81 18 134/75 (94) 96   














I/O      


 


 12/6/17 12/6/17 12/6/17 12/7/17 12/7/17 12/7/17





 07:00 15:00 23:00 07:00 15:00 23:00


 


Intake Total    640 ml  


 


Balance    640 ml  


 


      


 


Intake Oral    240 ml  


 


IV Total    400 ml  


 


# Voids    0  


 


# Bowel Movements    0  








Result Diagram:  


12/7/17 0630                                                                   

             12/7/17 0630





Imaging





Last 72 hours Impressions








GI Bleed Scan Nuclear Medicine 12/6/17 0000 Signed





Impressions: 





 Service Date/Time:  Wednesday, December 6, 2017 14:32 - CONCLUSION:  1. 

Negative 





 gastrointestinal bleeding scan     Gigi Eastman MD 








Objective Remarks


GENERAL: alert and cooperative


SKIN: Warm and dry.


HEAD: Normocephalic.


EYES: No scleral icterus. No injection or drainage. 


NECK: Supple, trachea midline. No JVD or lymphadenopathy.


CARDIOVASCULAR: Regular rate and rhythm without murmurs, gallops, or rubs. 


RESPIRATORY: Breath sounds equal bilaterally. No accessory muscle use.


GASTROINTESTINAL: Abdomen soft, non-tender, nondistended. 


MUSCULOSKELETAL: No cyanosis, or edema. 


BACK: Nontender without obvious deformity. No CVA tenderness.





Medications and IVs





Current Medications








 Medications


  (Trade)  Dose


 Ordered  Sig/Lawrence


 Route  Start Time


 Stop Time Status Last Admin


 


  (NS Flush)  2 ml  UNSCH  PRN


 IV FLUSH  12/6/17 00:15


     


 


 


  (NS Flush)  2 ml  BID


 IV FLUSH  12/6/17 09:00


     


 


 


  (Narcan Inj)  0.4 mg  UNSCH  PRN


 IV PUSH  12/6/17 00:15


     


 


 


  (Coreg)  6.25 mg  TID


 PO  12/6/17 09:00


    12/7/17 09:19


 


 


  (Protonix)  40 mg  DAILY


 PO  12/6/17 09:00


    12/7/17 09:19


 


 


 Octreotide


 Acetate 500 mcg/


 Sodium Chloride  500 ml @ 


 50 mls/hr  Q10H


 IV  12/6/17 00:26


    12/7/17 06:11


 


 


  (Zetia)  10 mg  HS


 PO  12/6/17 21:00


    12/6/17 21:41


 


 


  (Pravachol)  40 mg  HS


 PO  12/6/17 21:00


    12/6/17 21:41


 


 


  (Catapres)  0.1 mg  Q6H  PRN


 PO  12/6/17 11:45


    12/6/17 12:01


 


 


  (Norvasc)  10 mg  DAILY


 PO  12/6/17 11:45


     


 


 


  (Tylenol)  650 mg  Q4H  PRN


 PO  12/6/17 13:15


    12/7/17 09:19


 











Assessment and Plan


Problem List:  


(1) Hematochezia


ICD Codes:  K92.1 - Melena


Status:  Acute


(2) Abdominal pain


ICD Codes:  R10.9 - Unspecified abdominal pain


Assessment and Plan


12/06/17


Hematochezia:  GI consulted.  Clear liquid diet and sandostatin drip.  Frequent 

bloody stools.  Hemoglobin is stable at 15.7.  Colonscopy done 1 week ago with 

over 10 polyps removed.  Has had abdominal discomfort since that point was 

started on ciprofloxacin outpatient.  


HTN;  continue home medications well controlled on coreg


HLD:  continue home medications


Labs in am





12/07/07


Hematochezia:  GI consulted.  Bleeding scan ordered and negative scan for 

bleeding.  Continued on Clear liquid diet and sandostatin drip.  Bloody stools 

improved.  Hemoglobin is stable dropped to 9.6 today will transfuse as needed. 

Per GI note may be related to polypectomies.  


HTN;  elevated yesterday.  amlodipine added and prn clonidine


s/p fall:  fall reported per nursing.  Neuro checks every 4 hours.  Will order 

PT and OT.  Right shoulder and back discomfort reported that was relieved with 

tylenol





I and the ARNP have both examined this patient and reviewed this note and I 

agree with these findings and plan of care.  Gerald R Woodard DO Gellermann,Diane M. ARNP Dec 7, 2017 11:43

## 2017-12-07 NOTE — HHI.GIFU
GI Follow-up Note


Consult Follow-up





Subjective:  Patient just had BM-old blood. This ius his first BM since 

yesterday afternoon. RN reports pt fell in bathroom. 





Objective:


PHYSICAL EXAMINATION:


Vitals signs stable


No fever








NECK: Neck is supple, no JVD, no lymphadenopathy.


CHEST:  Chest is clear to auscultation and percussion.


CARDIAC:  Regular rate and rhythm with no murmur gallop or rubs.


ABDOMEN:  Soft, nondistended, nontender; no hepatosplenomegaly; bowel sounds 

are present in all four quadrants.


EXTREMITIES: No cedema.


SKIN:  No jaundice.


CNS:   alert and oriented times three.


Available Data (labs, X- Rays, Procedues) : 





hgb 10.2--9.6 this am








ASSESSMENT/PLAN:





1. Lower GI bleeding/hematochezia: better. may be related to polypectomies--,


   could also be diverticular bleeding.  


2. History of colon polyps, multiple


3. Colonic diverticulosis scattered throughout.


4. Right lower quadrant pain resolved.


5. recent fall-your W/U in this regard


 


RECOMMENDATIONS


1. Transfuse as needed.


2. Follow up labs.


3. Octreotide -cont for now


4. clear liquids





It was a pleasure seeing Jamshid Simon. Thank you for this consult.


Entered by: Alexandre Sandoval MD Dec 7, 2017 08:49

## 2017-12-08 VITALS
RESPIRATION RATE: 18 BRPM | HEART RATE: 64 BPM | SYSTOLIC BLOOD PRESSURE: 142 MMHG | OXYGEN SATURATION: 97 % | DIASTOLIC BLOOD PRESSURE: 69 MMHG | TEMPERATURE: 96.7 F

## 2017-12-08 VITALS
RESPIRATION RATE: 18 BRPM | HEART RATE: 69 BPM | SYSTOLIC BLOOD PRESSURE: 135 MMHG | OXYGEN SATURATION: 95 % | DIASTOLIC BLOOD PRESSURE: 59 MMHG | TEMPERATURE: 96.5 F

## 2017-12-08 VITALS
RESPIRATION RATE: 18 BRPM | TEMPERATURE: 96.1 F | HEART RATE: 97 BPM | OXYGEN SATURATION: 94 % | DIASTOLIC BLOOD PRESSURE: 66 MMHG | SYSTOLIC BLOOD PRESSURE: 140 MMHG

## 2017-12-08 VITALS
OXYGEN SATURATION: 95 % | DIASTOLIC BLOOD PRESSURE: 67 MMHG | HEART RATE: 89 BPM | RESPIRATION RATE: 18 BRPM | TEMPERATURE: 96.7 F | SYSTOLIC BLOOD PRESSURE: 152 MMHG

## 2017-12-08 VITALS
RESPIRATION RATE: 18 BRPM | HEART RATE: 63 BPM | TEMPERATURE: 96.7 F | SYSTOLIC BLOOD PRESSURE: 103 MMHG | DIASTOLIC BLOOD PRESSURE: 50 MMHG | OXYGEN SATURATION: 96 %

## 2017-12-08 VITALS
RESPIRATION RATE: 17 BRPM | SYSTOLIC BLOOD PRESSURE: 127 MMHG | OXYGEN SATURATION: 95 % | TEMPERATURE: 96.8 F | DIASTOLIC BLOOD PRESSURE: 62 MMHG | HEART RATE: 80 BPM

## 2017-12-08 VITALS — HEART RATE: 100 BPM

## 2017-12-08 LAB
ALP SERPL-CCNC: 46 U/L (ref 45–117)
ALT SERPL-CCNC: 46 U/L (ref 12–78)
AMYLASE SERPL-CCNC: 57 U/L (ref 25–115)
ANION GAP SERPL CALC-SCNC: 7 MEQ/L (ref 5–15)
AST SERPL-CCNC: 97 U/L (ref 15–37)
BASOPHILS # BLD AUTO: 0 TH/MM3 (ref 0–0.2)
BASOPHILS NFR BLD: 0.4 % (ref 0–2)
BILIRUB INDIRECT SERPL-MCNC: 0.3 MG/DL (ref 0–0.8)
BILIRUB SERPL-MCNC: 0.7 MG/DL (ref 0.2–1)
BUN SERPL-MCNC: 7 MG/DL (ref 7–18)
CHLORIDE SERPL-SCNC: 106 MEQ/L (ref 98–107)
EOSINOPHIL # BLD: 0 TH/MM3 (ref 0–0.4)
EOSINOPHIL NFR BLD: 0.3 % (ref 0–4)
ERYTHROCYTE [DISTWIDTH] IN BLOOD BY AUTOMATED COUNT: 12.6 % (ref 11.6–17.2)
GFR SERPLBLD BASED ON 1.73 SQ M-ARVRAT: 119 ML/MIN (ref 89–?)
HCO3 BLD-SCNC: 27.1 MEQ/L (ref 21–32)
HCT VFR BLD CALC: 23.5 % (ref 39–51)
HCT VFR BLD CALC: 23.7 % (ref 39–51)
HEMO FLAGS: (no result)
LYMPHOCYTES # BLD AUTO: 1.2 TH/MM3 (ref 1–4.8)
LYMPHOCYTES NFR BLD AUTO: 12 % (ref 9–44)
MCH RBC QN AUTO: 32 PG (ref 27–34)
MCHC RBC AUTO-ENTMCNC: 35.2 % (ref 32–36)
MCV RBC AUTO: 90.8 FL (ref 80–100)
MONOCYTES NFR BLD: 10.6 % (ref 0–8)
NEUTROPHILS # BLD AUTO: 7.6 TH/MM3 (ref 1.8–7.7)
NEUTROPHILS NFR BLD AUTO: 76.7 % (ref 16–70)
PLATELET # BLD: 191 TH/MM3 (ref 150–450)
POTASSIUM SERPL-SCNC: 3.6 MEQ/L (ref 3.5–5.1)
RBC # BLD AUTO: 2.61 MIL/MM3 (ref 4.5–5.9)
REVIEW FLAG: (no result)
SODIUM SERPL-SCNC: 140 MEQ/L (ref 136–145)
WBC # BLD AUTO: 9.9 TH/MM3 (ref 4–11)

## 2017-12-08 RX ADMIN — OCTREOTIDE ACETATE SCH MLS/HR: 1000 INJECTION, SOLUTION INTRAVENOUS; SUBCUTANEOUS at 02:41

## 2017-12-08 RX ADMIN — SIMETHICONE CHEW TAB 80 MG PRN MG: 80 TABLET ORAL at 18:05

## 2017-12-08 RX ADMIN — CARVEDILOL SCH MG: 6.25 TABLET, FILM COATED ORAL at 12:16

## 2017-12-08 RX ADMIN — CARVEDILOL SCH MG: 6.25 TABLET, FILM COATED ORAL at 18:05

## 2017-12-08 RX ADMIN — Medication SCH ML: at 20:04

## 2017-12-08 RX ADMIN — EZETIMIBE SCH MG: 10 TABLET ORAL at 20:03

## 2017-12-08 RX ADMIN — LANSOPRAZOLE SCH MG: 30 TABLET, ORALLY DISINTEGRATING, DELAYED RELEASE ORAL at 20:03

## 2017-12-08 RX ADMIN — PRAVASTATIN SODIUM SCH MG: 40 TABLET ORAL at 20:03

## 2017-12-08 RX ADMIN — HYDROCODONE BITARTRATE AND ACETAMINOPHEN PRN TAB: 5; 325 TABLET ORAL at 08:01

## 2017-12-08 RX ADMIN — SIMETHICONE CHEW TAB 80 MG PRN MG: 80 TABLET ORAL at 01:21

## 2017-12-08 RX ADMIN — SIMETHICONE CHEW TAB 80 MG PRN MG: 80 TABLET ORAL at 06:01

## 2017-12-08 RX ADMIN — OCTREOTIDE ACETATE SCH MLS/HR: 1000 INJECTION, SOLUTION INTRAVENOUS; SUBCUTANEOUS at 20:04

## 2017-12-08 RX ADMIN — CARVEDILOL SCH MG: 6.25 TABLET, FILM COATED ORAL at 08:03

## 2017-12-08 RX ADMIN — Medication SCH ML: at 08:03

## 2017-12-08 RX ADMIN — ACETAMINOPHEN PRN MG: 325 TABLET ORAL at 02:44

## 2017-12-08 RX ADMIN — HYDROCODONE BITARTRATE AND ACETAMINOPHEN PRN TAB: 5; 325 TABLET ORAL at 13:37

## 2017-12-08 RX ADMIN — SIMETHICONE CHEW TAB 80 MG PRN MG: 80 TABLET ORAL at 11:50

## 2017-12-08 NOTE — HHI.PR
Subjective


Remarks


Patient complaining pain in back region.  Tender on palpation.





Objective





Vital Signs








  Date Time  Temp Pulse Resp B/P (MAP) Pulse Ox O2 Delivery O2 Flow Rate FiO2


 


12/8/17 08:00 96.5 69 18 135/59 (84) 95   


 


12/8/17 04:15 96.8 80 17 127/62 (83) 95   


 


12/8/17 00:38 96.7 64 18 142/69 (93) 97   


 


12/7/17 20:00 97.8 60 18 128/56 (80) 98   


 


12/7/17 19:50 97.8 60 18 128/56 (80) 98   


 


12/7/17 16:00 95.6 68 18 142/71 (94) 100   


 


12/7/17 15:03  73      


 


12/7/17 11:20 96.4 61 18 134/62 (86) 100   














I/O      


 


 12/7/17 12/7/17 12/7/17 12/8/17 12/8/17 12/8/17





 07:00 15:00 23:00 07:00 15:00 23:00


 


Intake Total 640 ml 720 ml 360 ml 740 ml  


 


Balance 640 ml 720 ml 360 ml 740 ml  


 


      


 


Intake Oral 240 ml 720 ml 360 ml 240 ml  


 


IV Total 400 ml   500 ml  


 


# Voids 0 2 2 1  


 


# Bowel Movements 0 1 0 0  








Result Diagram:  


12/8/17 0650                                                                   

             12/8/17 0650





Imaging





Last 72 hours Impressions








GI Bleed Scan Nuclear Medicine 12/6/17 0000 Signed





Impressions: 





 Service Date/Time:  Wednesday, December 6, 2017 14:32 - CONCLUSION:  1. 

Negative 





 gastrointestinal bleeding scan     Gigi Eastman MD 








Objective Remarks


GENERAL: alert and cooperative


SKIN: Warm and dry.


HEAD: Normocephalic.


EYES: No scleral icterus. No injection or drainage. 


NECK: Supple, trachea midline. No JVD or lymphadenopathy.


CARDIOVASCULAR: Regular rate and rhythm without murmurs, gallops, or rubs. 


RESPIRATORY: Breath sounds equal bilaterally. No accessory muscle use.


GASTROINTESTINAL: Abdomen soft, non-tender, nondistended. 


MUSCULOSKELETAL: No cyanosis, or edema. Tenderness palpated in mid back region


BACK: Nontender without obvious deformity. No CVA tenderness.





Medications and IVs





Current Medications








 Medications


  (Trade)  Dose


 Ordered  Sig/Lawrence


 Route  Start Time


 Stop Time Status Last Admin


 


  (NS Flush)  2 ml  UNSCH  PRN


 IV FLUSH  12/6/17 00:15


     


 


 


  (NS Flush)  2 ml  BID


 IV FLUSH  12/6/17 09:00


     


 


 


  (Narcan Inj)  0.4 mg  UNSCH  PRN


 IV PUSH  12/6/17 00:15


     


 


 


  (Coreg)  6.25 mg  TID


 PO  12/6/17 09:00


    12/8/17 08:03


 


 


  (Zetia)  10 mg  HS


 PO  12/6/17 21:00


    12/7/17 19:59


 


 


  (Pravachol)  40 mg  HS


 PO  12/6/17 21:00


    12/7/17 19:58


 


 


  (Catapres)  0.1 mg  Q6H  PRN


 PO  12/6/17 11:45


    12/6/17 12:01


 


 


  (Norvasc)  10 mg  DAILY


 PO  12/6/17 11:45


     


 


 


  (Tylenol)  650 mg  Q4H  PRN


 PO  12/6/17 13:15


    12/8/17 02:44


 


 


  (Prevacid Odt)  30 mg  HS@2000


 PO  12/7/17 20:00


    12/7/17 19:58


 


 


  (Levsin)  0.125 mg  Q6H  PRN


 SL  12/7/17 19:45


     


 


 


  (Mylicon Chew)  80 mg  Q4H  PRN


 PO  12/7/17 20:15


    12/8/17 06:01


 


 


  (Norco  5-325 Mg)  1 tab  Q4H  PRN


 PO  12/8/17 07:00


    12/8/17 08:01


 











Assessment and Plan


Problem List:  


(1) Hematochezia


ICD Codes:  K92.1 - Melena


Status:  Acute


(2) Abdominal pain


ICD Codes:  R10.9 - Unspecified abdominal pain


Assessment and Plan


12/06/17


Hematochezia:  GI consulted.  Clear liquid diet and sandostatin drip.  Frequent 

bloody stools.  Hemoglobin is stable at 15.7.  Colonscopy done 1 week ago with 

over 10 polyps removed.  Has had abdominal discomfort since that point was 

started on ciprofloxacin outpatient.  


HTN;  continue home medications well controlled on coreg


HLD:  continue home medications


Labs in am





12/07/07


Hematochezia:  GI consulted.  Bleeding scan ordered and negative scan for 

bleeding.  Continued on Clear liquid diet and sandostatin drip.  Bloody stools 

improved.  Hemoglobin is stable dropped to 9.6 today will transfuse as needed. 

Per GI note may be related to polypectomies.  


HTN;  elevated yesterday.  amlodipine added and prn clonidine


s/p fall:  fall reported per nursing.  Neuro checks every 4 hours.  Will order 

PT and OT.  Right shoulder and back discomfort reported that was relieved with 

tylenol





12/08/17 


Hematochezia:  Sandostatin drip stopped and diet advanced.  HGB decreased to 

8.3 from 9.6 yesterday.  Blood pressure is stable.  Recheck in AM.  Prevacid 

and simethicone started. 


S/P Fall:  complaining of back discomfort in mid back region will obtain rib 

xray.  Norco added for pain .  Will need to monitor carefully as patient is 

sensitive to pain medication.  


PT and OT started. 





I and the ARNP have both examined this patient and reviewed this note and I 

agree with these findings and plan of care.  Gerald R Woodard DO Gellermann,Diane M. ARNP Dec 8, 2017 10:43

## 2017-12-08 NOTE — HHI.GIFU
GI Follow-up Note


Consult Follow-up





Subjective:  Patient laying in bed comfortably this am. had upper abd pain last 

PM. pain improved with Levsin. pt also wanted Prevacid instead of Protonix. 

some GERD-better this am. No BM or bleeding seen. No N/V





Objective:


PHYSICAL EXAMINATION:


135/59-69-18


No fever





HEENT: no jaundice.  Throat is clear.


NECK: no JVD, no lymphadenopathy.


CHEST:  Chest is clear to auscultation and percussion.


CARDIAC:  Regular rate and rhythm with no murmur gallop or rubs.


ABDOMEN:  Soft, nondistended, nontender; no hepatosplenomegaly; bowel sounds 

are present in all four quadrants.


EXTREMITIES: No clubbing, cyanosis, or edema.


SKIN: no rash; no jaundice.


CNS:   alert and oriented times three.


Available Data (labs, X- Rays, Procedues) : 





hgb dropped to 8.3 but no overt gi bleeding seen








ASSESSMENT/PLAN:





1. Lower GI bleeding/hematochezia: none today. may be related to polypectomies


   could also be diverticular bleeding.  


2. History of colon polyps, multiple


3. Colonic diverticulosis scattered throughout.


4. Right lower quadrant pain resolved.


5. recent fall


6. Upper abd pain/GERD


 


RECOMMENDATIONS


1. Transfuse as needed.


2. Follow up labs-amylase/lipase/lft


3. D/C Octreotide 


4. soft diet


5. If upper abd pain returns-will do CT scan








It was a pleasure seeing Jamshid Simon. Thank you for this consult.


Entered by: Alexandre Sandoval MD Dec 8, 2017 09:24

## 2017-12-09 VITALS
RESPIRATION RATE: 20 BRPM | OXYGEN SATURATION: 95 % | DIASTOLIC BLOOD PRESSURE: 66 MMHG | TEMPERATURE: 96.9 F | HEART RATE: 87 BPM | SYSTOLIC BLOOD PRESSURE: 132 MMHG

## 2017-12-09 VITALS
HEART RATE: 73 BPM | OXYGEN SATURATION: 94 % | TEMPERATURE: 97.8 F | RESPIRATION RATE: 18 BRPM | SYSTOLIC BLOOD PRESSURE: 149 MMHG | DIASTOLIC BLOOD PRESSURE: 68 MMHG

## 2017-12-09 VITALS
HEART RATE: 98 BPM | DIASTOLIC BLOOD PRESSURE: 60 MMHG | RESPIRATION RATE: 18 BRPM | TEMPERATURE: 99.1 F | OXYGEN SATURATION: 94 % | SYSTOLIC BLOOD PRESSURE: 136 MMHG

## 2017-12-09 VITALS
DIASTOLIC BLOOD PRESSURE: 62 MMHG | TEMPERATURE: 98.4 F | SYSTOLIC BLOOD PRESSURE: 136 MMHG | OXYGEN SATURATION: 95 % | RESPIRATION RATE: 18 BRPM | HEART RATE: 84 BPM

## 2017-12-09 VITALS
OXYGEN SATURATION: 93 % | TEMPERATURE: 100.6 F | RESPIRATION RATE: 18 BRPM | SYSTOLIC BLOOD PRESSURE: 142 MMHG | HEART RATE: 115 BPM | DIASTOLIC BLOOD PRESSURE: 65 MMHG

## 2017-12-09 VITALS
DIASTOLIC BLOOD PRESSURE: 55 MMHG | HEART RATE: 98 BPM | SYSTOLIC BLOOD PRESSURE: 110 MMHG | TEMPERATURE: 96.9 F | OXYGEN SATURATION: 94 % | RESPIRATION RATE: 18 BRPM

## 2017-12-09 VITALS
RESPIRATION RATE: 17 BRPM | DIASTOLIC BLOOD PRESSURE: 58 MMHG | OXYGEN SATURATION: 95 % | TEMPERATURE: 97.9 F | HEART RATE: 80 BPM | SYSTOLIC BLOOD PRESSURE: 113 MMHG

## 2017-12-09 VITALS
HEART RATE: 95 BPM | SYSTOLIC BLOOD PRESSURE: 133 MMHG | TEMPERATURE: 97 F | RESPIRATION RATE: 20 BRPM | DIASTOLIC BLOOD PRESSURE: 62 MMHG | OXYGEN SATURATION: 94 %

## 2017-12-09 VITALS — HEART RATE: 84 BPM

## 2017-12-09 VITALS
OXYGEN SATURATION: 94 % | TEMPERATURE: 98 F | HEART RATE: 82 BPM | SYSTOLIC BLOOD PRESSURE: 126 MMHG | RESPIRATION RATE: 18 BRPM | DIASTOLIC BLOOD PRESSURE: 76 MMHG

## 2017-12-09 VITALS
OXYGEN SATURATION: 95 % | RESPIRATION RATE: 18 BRPM | SYSTOLIC BLOOD PRESSURE: 147 MMHG | DIASTOLIC BLOOD PRESSURE: 65 MMHG | HEART RATE: 84 BPM | TEMPERATURE: 97.2 F

## 2017-12-09 VITALS
OXYGEN SATURATION: 96 % | DIASTOLIC BLOOD PRESSURE: 58 MMHG | HEART RATE: 95 BPM | TEMPERATURE: 96.2 F | RESPIRATION RATE: 20 BRPM | SYSTOLIC BLOOD PRESSURE: 131 MMHG

## 2017-12-09 VITALS
SYSTOLIC BLOOD PRESSURE: 124 MMHG | TEMPERATURE: 98.1 F | RESPIRATION RATE: 18 BRPM | OXYGEN SATURATION: 95 % | DIASTOLIC BLOOD PRESSURE: 60 MMHG | HEART RATE: 77 BPM

## 2017-12-09 LAB
BASOPHILS # BLD AUTO: 0 TH/MM3 (ref 0–0.2)
BASOPHILS NFR BLD: 0.1 % (ref 0–2)
EOSINOPHIL # BLD: 0 TH/MM3 (ref 0–0.4)
EOSINOPHIL NFR BLD: 0 % (ref 0–4)
ERYTHROCYTE [DISTWIDTH] IN BLOOD BY AUTOMATED COUNT: 12.7 % (ref 11.6–17.2)
HCT VFR BLD CALC: 21.6 % (ref 39–51)
HEMO FLAGS: (no result)
LYMPHOCYTES # BLD AUTO: 0.7 TH/MM3 (ref 1–4.8)
LYMPHOCYTES NFR BLD AUTO: 6.6 % (ref 9–44)
MCH RBC QN AUTO: 31.7 PG (ref 27–34)
MCHC RBC AUTO-ENTMCNC: 34.8 % (ref 32–36)
MCV RBC AUTO: 91.1 FL (ref 80–100)
MONOCYTES NFR BLD: 9.7 % (ref 0–8)
NEUTROPHILS # BLD AUTO: 9.3 TH/MM3 (ref 1.8–7.7)
NEUTROPHILS NFR BLD AUTO: 83.6 % (ref 16–70)
PLATELET # BLD: 167 TH/MM3 (ref 150–450)
RBC # BLD AUTO: 2.36 MIL/MM3 (ref 4.5–5.9)
WBC # BLD AUTO: 11.1 TH/MM3 (ref 4–11)

## 2017-12-09 PROCEDURE — 30233N1 TRANSFUSION OF NONAUTOLOGOUS RED BLOOD CELLS INTO PERIPHERAL VEIN, PERCUTANEOUS APPROACH: ICD-10-PCS | Performed by: FAMILY MEDICINE

## 2017-12-09 PROCEDURE — 0DJD8ZZ INSPECTION OF LOWER INTESTINAL TRACT, VIA NATURAL OR ARTIFICIAL OPENING ENDOSCOPIC: ICD-10-PCS | Performed by: INTERNAL MEDICINE

## 2017-12-09 RX ADMIN — Medication SCH ML: at 20:16

## 2017-12-09 RX ADMIN — SUCRALFATE SCH GM: 1 TABLET ORAL at 17:50

## 2017-12-09 RX ADMIN — OCTREOTIDE ACETATE SCH MLS/HR: 1000 INJECTION, SOLUTION INTRAVENOUS; SUBCUTANEOUS at 05:24

## 2017-12-09 RX ADMIN — Medication SCH ML: at 08:27

## 2017-12-09 RX ADMIN — EZETIMIBE SCH MG: 10 TABLET ORAL at 20:16

## 2017-12-09 RX ADMIN — SUCRALFATE SCH GM: 1 TABLET ORAL at 20:16

## 2017-12-09 RX ADMIN — PRAVASTATIN SODIUM SCH MG: 40 TABLET ORAL at 20:16

## 2017-12-09 RX ADMIN — CARVEDILOL SCH MG: 6.25 TABLET, FILM COATED ORAL at 13:37

## 2017-12-09 RX ADMIN — ACETAMINOPHEN PRN MG: 325 TABLET ORAL at 23:12

## 2017-12-09 RX ADMIN — ACETAMINOPHEN PRN MG: 325 TABLET ORAL at 11:00

## 2017-12-09 RX ADMIN — CARVEDILOL SCH MG: 6.25 TABLET, FILM COATED ORAL at 17:50

## 2017-12-09 RX ADMIN — OCTREOTIDE ACETATE SCH MLS/HR: 1000 INJECTION, SOLUTION INTRAVENOUS; SUBCUTANEOUS at 14:08

## 2017-12-09 RX ADMIN — ACETAMINOPHEN PRN MG: 325 TABLET ORAL at 00:29

## 2017-12-09 RX ADMIN — LANSOPRAZOLE SCH MG: 30 TABLET, ORALLY DISINTEGRATING, DELAYED RELEASE ORAL at 20:16

## 2017-12-09 RX ADMIN — CARVEDILOL SCH MG: 6.25 TABLET, FILM COATED ORAL at 08:27

## 2017-12-09 NOTE — HHI.GIFU
GI Follow-up Note


Consult Follow-up





Subjective:  Patient laying in bed comfortably- had more bleeding after 

Octreotide stopped. for colon this am. transfusion ordered





Objective:


PHYSICAL EXAMINATION:


Vitals signs stable


No fever





HEENT: no jaundice.  Throat is clear.


NECK: Neck is supple, no JVD, no lymphadenopathy.


CHEST:  Chest is clear to auscultation and percussion.


CARDIAC:  Regular rate and rhythm with no murmur gallop or rubs.


ABDOMEN:  Soft, nondistended, nontender; no hepatosplenomegaly; bowel sounds 

are present in all four quadrants.


EXTREMITIES: No  edema.


SKIN:  Normal; no rash; no jaundice.


CNS:  alert and oriented times three.


Available Data (labs, X- Rays, Procedues) : 





mildly elevated LFT and lipase. Hgb is 7.5








ASSESSMENT/PLAN:





1. Lower GI bleeding/hematochezia: started again .may be related to 

polypectomies


   could also be diverticular bleeding.  


2. History of colon polyps, multiple


3. Colonic diverticulosis scattered throughout.


4. Right lower quadrant pain resolved.


5. recent fall


6. Upper abd pain/GERD-better


7. Elevated LFT and lipase 


 


RECOMMENDATIONS


1. Transfuse 


2. Colonoscopy today. Indications, rrisks, alternatives, benefits, limitations, 

complications d/w pt/family including risk of bleeding, perforation, infection 

and small possibility of death.


3. Conrt Octreotide 


4. NPO for colon


5. If upper abd pain returns-will do CT scan





It was a pleasure seeing Jamshid Simon. Thank you for this consult.


Entered by: Alexandre Sandoval MD Dec 9, 2017 10:24

## 2017-12-09 NOTE — GIPROC
[facility]

[address] [facility]. [facility] [place], [address]

 

 

COLONOSCOPY PROCEDURE REPORT     EXAM DATE: 12/09/2017

 

PATIENT NAME:      Jamshid Simon           MR #:      [id number]

YOB: 1940      VISIT #:     [id number]

ENDOSCOPIST:     Alexandre Camarena MD     ORDER #:     [id number]

ASSISTANT:      Tawana Hanson and Deborah Stoner     STATUS:     inpatient

 

 

INDICATIONS:  The patient is a 77 yr old male here for a colonoscopy due to

hematochezia

PROCEDURE PERFORMED:     Colonoscopy, diagnostic

MEDICATIONS:     None and Per Anesthesia.

PREP QUALITY:     suboptimal PREP TYPE:GoLytely

ESTIMATED BLOOD LOSS:     None

 

CONSENT: The patient understands the risks and benefits of the procedure and

understands that these risks include, but are not limited to: sedation,

allergic reaction, infection, perforation and/or bleeding. Alternative means of

evaluation and treatment include, among others: physical exam, x-rays, and/or

surgical intervention. The patient elects to proceed with this endoscopic

procedure.

 



medical equipment was checked for proper function. Hand hygiene and appropriate

measures for infection prevention was taken. After the risks, benefits and

alternatives of the procedure were thoroughly explained, Informed consent was

verified, confirmed and timeout was successfully executed by the treatment

team. A digital exam revealed no abnormalities of the rectum The Pentax

[id number] endoscope was introduced through the anus and advanced to the ileum.

The instrument was then slowly withdrawn as the colon was fully examined.

 

 

COLON FINDINGS: A single non-bleeding non-bleeding and clean-based ulcer,

measuring 10mm in size, was found at the cecum.   A flat polyp measuring 2 cm

in size was found in the ascending colon.   Mild diverticulosis was noted in

the ascending colon, transverse colon, descending colon, and sigmoid colon.  No

bleeding was noted from the diverticulosis.   Old blood noted in distal

transverse to descending colon-no active bleeding lesions seen.   Small

internal hemorrhoids were found. Retroflexion was performed and was normal The

scope was then completely withdrawn from the patient and the procedure

terminated.

PROCEDURE WITHDRAWAL TIME:32minutes

 

 

 

ADVERSE EVENTS:      There were no complications.

IMPRESSIONS:     1.  Single non-bleeding ulcer, measuring 10mm in size, was

found at the cecum

2.  A flat polyp measuring 2 cm in size was found in the ascending colon -left

alone

3.  Mild diverticulosis was noted in the ascending colon, transverse colon,

descending colon, and sigmoid colon

4.  Old blood noted in distal transverse to descending colon-no active bleeding

lesions seen

5.  Small internal hemorrhoids

6.  Retroflexion was performed and was normal

7.  Revealed no abnormalities of the rectum

 

RECOMMENDATIONS:     Observation

RECALL:     NONE

 

_____________________________

Alexandre Camarena MD

eSigned:  Alexandre Camarena MD 12/09/2017 12:39 PM

 

 

cc:

 

 

 

 

PATIENT NAME:  Jamshid Simon

MR#: [id number]

## 2017-12-10 VITALS
DIASTOLIC BLOOD PRESSURE: 71 MMHG | RESPIRATION RATE: 18 BRPM | HEART RATE: 80 BPM | OXYGEN SATURATION: 95 % | SYSTOLIC BLOOD PRESSURE: 153 MMHG | TEMPERATURE: 98 F

## 2017-12-10 VITALS
SYSTOLIC BLOOD PRESSURE: 181 MMHG | OXYGEN SATURATION: 94 % | RESPIRATION RATE: 18 BRPM | DIASTOLIC BLOOD PRESSURE: 85 MMHG | TEMPERATURE: 98.9 F | HEART RATE: 84 BPM

## 2017-12-10 VITALS
OXYGEN SATURATION: 99 % | TEMPERATURE: 97.1 F | DIASTOLIC BLOOD PRESSURE: 74 MMHG | RESPIRATION RATE: 17 BRPM | SYSTOLIC BLOOD PRESSURE: 158 MMHG | HEART RATE: 81 BPM

## 2017-12-10 VITALS
OXYGEN SATURATION: 95 % | RESPIRATION RATE: 18 BRPM | TEMPERATURE: 98 F | DIASTOLIC BLOOD PRESSURE: 70 MMHG | SYSTOLIC BLOOD PRESSURE: 150 MMHG | HEART RATE: 80 BPM

## 2017-12-10 VITALS
RESPIRATION RATE: 18 BRPM | DIASTOLIC BLOOD PRESSURE: 88 MMHG | SYSTOLIC BLOOD PRESSURE: 183 MMHG | OXYGEN SATURATION: 95 % | HEART RATE: 89 BPM | TEMPERATURE: 98.2 F

## 2017-12-10 VITALS
DIASTOLIC BLOOD PRESSURE: 73 MMHG | SYSTOLIC BLOOD PRESSURE: 179 MMHG | OXYGEN SATURATION: 98 % | HEART RATE: 82 BPM | RESPIRATION RATE: 17 BRPM | TEMPERATURE: 98.9 F

## 2017-12-10 VITALS
HEART RATE: 80 BPM | SYSTOLIC BLOOD PRESSURE: 151 MMHG | DIASTOLIC BLOOD PRESSURE: 70 MMHG | OXYGEN SATURATION: 97 % | RESPIRATION RATE: 17 BRPM | TEMPERATURE: 99 F

## 2017-12-10 VITALS
HEART RATE: 83 BPM | TEMPERATURE: 99.7 F | SYSTOLIC BLOOD PRESSURE: 201 MMHG | DIASTOLIC BLOOD PRESSURE: 93 MMHG | OXYGEN SATURATION: 98 % | RESPIRATION RATE: 17 BRPM

## 2017-12-10 LAB
ERYTHROCYTE [DISTWIDTH] IN BLOOD BY AUTOMATED COUNT: 13.5 % (ref 11.6–17.2)
HCT VFR BLD CALC: 27 % (ref 39–51)
HEMO FLAGS: (no result)
MCH RBC QN AUTO: 31.6 PG (ref 27–34)
MCHC RBC AUTO-ENTMCNC: 34.6 % (ref 32–36)
MCV RBC AUTO: 91.1 FL (ref 80–100)
NEUTS BAND # BLD MANUAL: 6.6 TH/MM3 (ref 1.8–7.7)
NEUTS BAND NFR BLD: 2 % (ref 0–6)
NEUTS SEG NFR BLD MANUAL: 83 % (ref 16–70)
PLAT MORPH BLD: NORMAL
PLATELET # BLD: 136 TH/MM3 (ref 150–450)
PLATELET BLD QL SMEAR: (no result)
RBC # BLD AUTO: 2.97 MIL/MM3 (ref 4.5–5.9)
SCAN/DIFF: (no result)
WBC # BLD AUTO: 7.8 TH/MM3 (ref 4–11)
WBC DIFF SAMPLE: 100

## 2017-12-10 RX ADMIN — SUCRALFATE SCH GM: 1 TABLET ORAL at 11:54

## 2017-12-10 RX ADMIN — SUCRALFATE SCH GM: 1 TABLET ORAL at 09:18

## 2017-12-10 RX ADMIN — OCTREOTIDE ACETATE SCH MLS/HR: 1000 INJECTION, SOLUTION INTRAVENOUS; SUBCUTANEOUS at 01:30

## 2017-12-10 RX ADMIN — SUCRALFATE SCH GM: 1 TABLET ORAL at 17:06

## 2017-12-10 RX ADMIN — CARVEDILOL SCH MG: 6.25 TABLET, FILM COATED ORAL at 11:54

## 2017-12-10 RX ADMIN — PRAVASTATIN SODIUM SCH MG: 40 TABLET ORAL at 23:04

## 2017-12-10 RX ADMIN — OCTREOTIDE ACETATE SCH MLS/HR: 1000 INJECTION, SOLUTION INTRAVENOUS; SUBCUTANEOUS at 11:52

## 2017-12-10 RX ADMIN — OCTREOTIDE ACETATE SCH MLS/HR: 1000 INJECTION, SOLUTION INTRAVENOUS; SUBCUTANEOUS at 23:04

## 2017-12-10 RX ADMIN — CARVEDILOL SCH MG: 6.25 TABLET, FILM COATED ORAL at 09:18

## 2017-12-10 RX ADMIN — Medication SCH ML: at 09:18

## 2017-12-10 RX ADMIN — CARVEDILOL SCH MG: 6.25 TABLET, FILM COATED ORAL at 17:05

## 2017-12-10 RX ADMIN — SUCRALFATE SCH GM: 1 TABLET ORAL at 23:04

## 2017-12-10 RX ADMIN — LANSOPRAZOLE SCH MG: 30 TABLET, ORALLY DISINTEGRATING, DELAYED RELEASE ORAL at 23:04

## 2017-12-10 RX ADMIN — EZETIMIBE SCH MG: 10 TABLET ORAL at 23:04

## 2017-12-10 NOTE — HHI.GIFU
GI Follow-up Note


Consult Follow-up





Subjective:  Patient laying in bed -no GI bleeding (no BM). + flatus. Some 

right sided abd pain





Objective:


PHYSICAL EXAMINATION:


Vitals signs stable


No fever





HEENT: Pupils round and reactive to light; normocephalic; atraumatic; no 

jaundice.  Throat is clear.


NECK: Neck is supple, no JVD, no lymphadenopathy.


CHEST:  Chest is clear to auscultation and percussion.


CARDIAC:  Regular rate and rhythm with no murmur gallop or rubs.


ABDOMEN:  Soft, nondistended, minimal right sided tenderness; no 

hepatosplenomegaly; bowel sounds are present in all four quadrants.


EXTREMITIES: No clubbing, cyanosis, or edema.


SKIN:   no jaundice.


CNS:  No focal deficits; alert and oriented times three.


Available Data (labs, X- Rays, Procedues) : 





Hgb 9.5








ASSESSMENT/PLAN:








1. Lower GI bleeding/hematochezia -none today. A colonic ulcer was noted in the 

cecum-prob related to polypectomies. Other source of bleeding may be 

diverticulosis. Unfortunately prpe was not the best and the other polypectomy 

sites and an AVM's could not be seen. Old bloos was noted in the distal 

transverse colon/descending colon area


2. History of colon polyps, multiple


3. Colonic diverticulosis scattered throughout.


4. Right lower quadrant pain-returns


5. recent fall


6. Upper abd pain/GERD-better


7. Elevated LFT and lipase 


8. Cecal ulcer


 


RECOMMENDATIONS


1. Transfuse as needed


2. Ct scan of abd/pelvis


3. Cont Octreotide 


4. Full liquid diet


5. Carafate added-sometimes helps colonic ulcers


It was a pleasure seeing Jamshid Simon. Thank you for this consult.


Entered by: Alexandre Sandoval MD Dec 10, 2017 13:37

## 2017-12-10 NOTE — HHI.PR
Subjective


Remarks


no further bleeding   hgb 9.5 s/p transfusioin   r sided pain persists    

colonoscopy no active bleed identified





Objective





Vital Signs








  Date Time  Temp Pulse Resp B/P (MAP) Pulse Ox O2 Delivery O2 Flow Rate FiO2


 


12/10/17 08:00 99.0 80 17 151/70 (97) 97   


 


12/10/17 04:00 98.0 80 18 153/71 (98) 95   


 


12/10/17 04:00  80      


 


12/10/17 00:00 98.0 80 18 150/70 (96) 95   


 


12/9/17 23:00     94 Nasal Cannula 2.00 


 


12/9/17 23:00  84      


 


12/9/17 20:00 97.8 73 18 149/68 (95) 94   


 


12/9/17 18:44      Nasal Cannula 2.00 


 


12/9/17 17:32 97.2 84 18 147/65 95   


 


12/9/17 17:15 98.4 84 18 136/62 95   


 


12/9/17 16:00 97.9 80 17 113/58 (76) 95   


 


12/9/17 14:05 98.1 77 18 124/60 95   


 


12/9/17 13:17 98.0 82 18 126/76 (93) 94   


 


12/9/17 12:54  77 16 112/57 (75) 97 Nasal Cannula 3 


 


12/9/17 12:45  80 16 98/57 (71) 96 Nasal Cannula 3 


 


12/9/17 12:35 100.5 80 16 95/54 (68) 90 Nasal Cannula 3 


 


12/9/17 12:00 97.0 95 20 133/62 (85) 94   


 


12/9/17 10:40 99.1 98 18 136/60 94   














I/O      


 


 12/9/17 12/9/17 12/9/17 12/10/17 12/10/17 12/10/17





 07:00 15:00 23:00 07:00 15:00 23:00


 


Intake Total 0 ml 400 ml 595 ml 446 ml  


 


Output Total    600 ml  


 


Balance 0 ml 400 ml 595 ml -154 ml  


 


      


 


Intake Oral 0 ml  180 ml   


 


IV Total    446 ml  


 


Packed Cells  400 ml 400 ml   


 


Blood Product IV Normal Saline Flush   15 ml   


 


Output Urine Total    600 ml  


 


# Voids 4  2   


 


# Bowel Movements 6  0   








Result Diagram:  


12/10/17 0444                                                                  

              12/8/17 0650





Objective Remarks


GENERAL: Well-nourished, well-developed patient.


SKIN: Warm and dry.


HEAD: Normocephalic.


EYES: No scleral icterus. No injection or drainage. 


NECK: Supple, trachea midline. No JVD or lymphadenopathy.


CARDIOVASCULAR: Regular rate and rhythm without murmurs, gallops, or rubs. 


RESPIRATORY: Breath sounds equal bilaterally. No accessory muscle use.


GASTROINTESTINAL: Abdomen soft, r sided tenderness oresent


EXTREMITIES: No cyanosis, or edema. 


NEUROLOGICAL: Awake, alert, and oriented x 3. Non-focal.


Medications and IVs





Inpatient Medications


Acetaminophen (Tylenol) 650 mg Q4H  PRN PO HEADACHE Last administered on 12/9/ 17at 23:12;  Start 12/6/17 at 13:15


Acetaminophen/ Hydrocodone Bitart (Norco  5-325 Mg) 1 tab Q4H  PRN PO pain 1-10 

Last administered on 12/8/17at 13:37;  Start 12/8/17 at 07:00


Amlodipine Besylate (Norvasc) 10 mg DAILY PO  Last administered on 12/9/17at 08:

26;  Start 12/6/17 at 11:45


Carvedilol (Coreg) 6.25 mg TID PO  Last administered on 12/10/17at 09:18;  

Start 12/6/17 at 09:00


Clonidine (Catapres) 0.1 mg Q6H  PRN PO SBP> OR = 180, DBP> OR = 100 Last 

administered on 12/6/17at 12:01;  Start 12/6/17 at 11:45


EZETIMIBE (Zetia) 10 mg HS PO  Last administered on 12/9/17at 20:16;  Start 12/6 /17 at 21:00


Flumazenil (Romazicon Inj) 0.2 mg Q1M  PRN IV PUSH OVERSEDATION;  Start 12/9/17 

at 12:45;  Stop 12/10/17 at 12:44


Hyoscyamine Sulfate (Levsin) 0.125 mg Q6H  PRN SL ABDOMINAL CRAMPING;  Start 12/ 7/17 at 19:45


Lansoprazole (Prevacid Odt) 30 mg HS@2000 PO  Last administered on 12/9/17at 20:

16;  Start 12/7/17 at 20:00


Miscellaneous Information ALL NURSING DEPARTME... UNSCH  PRN .XX SEE LABEL 

COMMENTS;  Start 12/9/17 at 12:42;  Stop 12/10/17 at 12:41


Naloxone HCl (Narcan Inj) 0.1 mg Q2M  PRN IV PUSH OVERSEDATION;  Start 12/9/17 

at 12:45;  Stop 12/10/17 at 12:44


Octreotide Acetate 500 mcg/ Sodium Chloride 500 ml @  50 mls/hr Q10H IV  Last 

administered on 12/10/17at 01:30;  Start 12/8/17 at 19:00


Octreotide Acetate (SandoSTATIN INJ) 50 mcg ONCE  ONCE IV PUSH  Last 

administered on 12/6/17at 02:17;  Start 12/6/17 at 00:30;  Stop 12/6/17 at 00:35

;  Status DC


Pantoprazole Sodium (Protonix) 40 mg DAILY PO  Last administered on 12/7/17at 09

:19;  Start 12/6/17 at 09:00;  Stop 12/7/17 at 19:30;  Status DC


Polyethylene Glycol/ Electrolytes (Colyte Liq) 4,000 ml ONCE  ONCE PO  Last 

administered on 12/8/17at 18:05;  Start 12/8/17 at 16:30;  Stop 12/8/17 at 16:31

;  Status DC


Pravastatin Sodium (Pravachol) 40 mg HS PO  Last administered on 12/9/17at 20:16

;  Start 12/6/17 at 21:00


Simethicone (Mylicon Chew) 80 mg Q4H  PRN PO GAS PAIN Last administered on 12/8/ 17at 18:05;  Start 12/7/17 at 20:15


Sodium Chloride (NS Flush) 2 ml BID IV FLUSH  Last administered on 12/10/17at 09

:18;  Start 12/6/17 at 09:00


Sucralfate (Carafate) 1 gm ACHS PO  Last administered on 12/10/17at 09:18;  

Start 12/9/17 at 17:00





Assessment and Plan


Problem List:  


(1) GI bleed


ICD Codes:  K92.2 - Gastrointestinal hemorrhage, unspecified


Plan:  transfuse and scope  repeat cbc am





Assessment and Plan


GI Bleed  hgb 9.5  no source identified   will continue conservative rx











Jose A Mendenhall DO Dec 10, 2017 10:41

## 2017-12-10 NOTE — MB
cc:

MEESE,DAVID L. MD

****

 

 

DATE OF CONSULTATION:  12/10/2017

 

REASON FOR CONSULTATION:

 

 

HISTORY OF PRESENT ILLNESS

This is a 77-year-old male who is approximately two weeks status post multiple

right colon polypectomies.  The patient has had some right-sided abdominal

soreness followed by GI bleeding.  He was admitted to the hospital and had

continued bleeding with significant hemoglobin drop to seven. On repeat

colonoscopy he had no active bleeding.  GI bleeding scan was negative.

 

At this time the patient denies abdominal pain.  He has had no further

bleeding.  His hemoglobin has risen to 9.5 after two units of blood and

transfusion.

 

MEDICATIONS:

The patient was on aspirin at home.

 

ASSESSMENT

Post polypectomy bleeding that appears to have stopped.  The bleeding was

possibly aggravated by aspirin therapy.  His post polypectomy abdominal

tenderness appears to be resolved.

 

PLAN:

Continue conservative treatment.

 

 

 

                              _________________________________

                              David Meese, MD DM/BELINDA

D:  12/10/2017/11:11 AM

T:  12/10/2017/11:54 AM

Visit #:  [id number]

Job #:  [id number]

## 2017-12-11 VITALS
DIASTOLIC BLOOD PRESSURE: 87 MMHG | RESPIRATION RATE: 18 BRPM | OXYGEN SATURATION: 97 % | SYSTOLIC BLOOD PRESSURE: 183 MMHG | TEMPERATURE: 96.7 F | HEART RATE: 81 BPM

## 2017-12-11 VITALS — HEART RATE: 65 BPM

## 2017-12-11 VITALS
RESPIRATION RATE: 19 BRPM | TEMPERATURE: 97.1 F | HEART RATE: 86 BPM | DIASTOLIC BLOOD PRESSURE: 78 MMHG | OXYGEN SATURATION: 93 % | SYSTOLIC BLOOD PRESSURE: 165 MMHG

## 2017-12-11 VITALS
RESPIRATION RATE: 18 BRPM | SYSTOLIC BLOOD PRESSURE: 188 MMHG | OXYGEN SATURATION: 95 % | TEMPERATURE: 96.7 F | HEART RATE: 86 BPM | DIASTOLIC BLOOD PRESSURE: 87 MMHG

## 2017-12-11 VITALS
OXYGEN SATURATION: 98 % | HEART RATE: 73 BPM | SYSTOLIC BLOOD PRESSURE: 156 MMHG | RESPIRATION RATE: 18 BRPM | DIASTOLIC BLOOD PRESSURE: 72 MMHG | TEMPERATURE: 95.4 F

## 2017-12-11 VITALS
OXYGEN SATURATION: 99 % | HEART RATE: 77 BPM | RESPIRATION RATE: 18 BRPM | SYSTOLIC BLOOD PRESSURE: 192 MMHG | DIASTOLIC BLOOD PRESSURE: 91 MMHG | TEMPERATURE: 97.6 F

## 2017-12-11 VITALS
SYSTOLIC BLOOD PRESSURE: 109 MMHG | RESPIRATION RATE: 18 BRPM | TEMPERATURE: 96 F | HEART RATE: 64 BPM | OXYGEN SATURATION: 91 % | DIASTOLIC BLOOD PRESSURE: 64 MMHG

## 2017-12-11 VITALS — HEART RATE: 67 BPM

## 2017-12-11 VITALS — HEART RATE: 72 BPM

## 2017-12-11 VITALS — HEART RATE: 80 BPM

## 2017-12-11 LAB
ALP SERPL-CCNC: 283 U/L (ref 45–117)
ALT SERPL-CCNC: 74 U/L (ref 12–78)
AMYLASE SERPL-CCNC: 33 U/L (ref 25–115)
AST SERPL-CCNC: 91 U/L (ref 15–37)
BASOPHILS # BLD AUTO: 0 TH/MM3 (ref 0–0.2)
BASOPHILS NFR BLD: 0.2 % (ref 0–2)
BILIRUB INDIRECT SERPL-MCNC: 0.3 MG/DL (ref 0–0.8)
BILIRUB SERPL-MCNC: 0.8 MG/DL (ref 0.2–1)
EOSINOPHIL # BLD: 0 TH/MM3 (ref 0–0.4)
EOSINOPHIL NFR BLD: 0.1 % (ref 0–4)
ERYTHROCYTE [DISTWIDTH] IN BLOOD BY AUTOMATED COUNT: 13.7 % (ref 11.6–17.2)
HCT VFR BLD CALC: 28.4 % (ref 39–51)
HEMO FLAGS: (no result)
LYMPHOCYTES # BLD AUTO: 0.8 TH/MM3 (ref 1–4.8)
LYMPHOCYTES NFR BLD AUTO: 10.4 % (ref 9–44)
MCH RBC QN AUTO: 31.7 PG (ref 27–34)
MCHC RBC AUTO-ENTMCNC: 34.7 % (ref 32–36)
MCV RBC AUTO: 91.3 FL (ref 80–100)
MONOCYTES NFR BLD: 11.2 % (ref 0–8)
NEUTROPHILS # BLD AUTO: 5.8 TH/MM3 (ref 1.8–7.7)
NEUTROPHILS NFR BLD AUTO: 78.1 % (ref 16–70)
PLATELET # BLD: 159 TH/MM3 (ref 150–450)
RBC # BLD AUTO: 3.1 MIL/MM3 (ref 4.5–5.9)
WBC # BLD AUTO: 7.5 TH/MM3 (ref 4–11)

## 2017-12-11 RX ADMIN — SUCRALFATE SCH GM: 1 TABLET ORAL at 20:24

## 2017-12-11 RX ADMIN — CARVEDILOL SCH MG: 6.25 TABLET, FILM COATED ORAL at 12:44

## 2017-12-11 RX ADMIN — CARVEDILOL SCH MG: 6.25 TABLET, FILM COATED ORAL at 18:41

## 2017-12-11 RX ADMIN — LANSOPRAZOLE SCH MG: 30 TABLET, ORALLY DISINTEGRATING, DELAYED RELEASE ORAL at 20:24

## 2017-12-11 RX ADMIN — Medication SCH ML: at 09:52

## 2017-12-11 RX ADMIN — PRAVASTATIN SODIUM SCH MG: 40 TABLET ORAL at 20:24

## 2017-12-11 RX ADMIN — OCTREOTIDE ACETATE SCH MLS/HR: 1000 INJECTION, SOLUTION INTRAVENOUS; SUBCUTANEOUS at 07:00

## 2017-12-11 RX ADMIN — HYDROCODONE BITARTRATE AND ACETAMINOPHEN PRN TAB: 5; 325 TABLET ORAL at 09:51

## 2017-12-11 RX ADMIN — SUCRALFATE SCH GM: 1 TABLET ORAL at 08:00

## 2017-12-11 RX ADMIN — CARVEDILOL SCH MG: 6.25 TABLET, FILM COATED ORAL at 09:51

## 2017-12-11 RX ADMIN — HYDROCODONE BITARTRATE AND ACETAMINOPHEN PRN TAB: 5; 325 TABLET ORAL at 03:47

## 2017-12-11 RX ADMIN — SUCRALFATE SCH GM: 1 TABLET ORAL at 12:00

## 2017-12-11 RX ADMIN — EZETIMIBE SCH MG: 10 TABLET ORAL at 20:25

## 2017-12-11 RX ADMIN — Medication SCH ML: at 20:25

## 2017-12-11 RX ADMIN — SUCRALFATE SCH GM: 1 TABLET ORAL at 18:41

## 2017-12-11 NOTE — HHI.PR
Subjective


Remarks


Denies bloody stools overnight and no GI complaints.





Objective





Vital Signs








  Date Time  Temp Pulse Resp B/P (MAP) Pulse Ox O2 Delivery O2 Flow Rate FiO2


 


12/11/17 12:00 96.0 64 18 109/64 (79) 91   


 


12/11/17 08:00 95.4 73 18 156/72 (100) 98   


 


12/11/17 06:47  72      


 


12/11/17 03:15 97.1 86 19 165/78 (107) 93   


 


12/10/17 23:05 98.2 89 18 183/88 (119) 95   


 


12/10/17 20:43 98.9 84 18 181/85 (117) 94   


 


12/10/17 20:01     97 Nasal Cannula 2.00 


 


12/10/17 18:15 98.9 82 17 179/73 (108) 98   


 


12/10/17 16:00 99.7 83 17 201/93 (129) 98   














I/O      


 


 12/10/17 12/10/17 12/10/17 12/11/17 12/11/17 12/11/17





 07:00 15:00 23:00 07:00 15:00 23:00


 


Intake Total 446 ml 460 ml 360 ml 240 ml 143 ml 


 


Output Total 600 ml     


 


Balance -154 ml 460 ml 360 ml 240 ml 143 ml 


 


      


 


Intake Oral  460 ml 360 ml 240 ml  


 


IV Total 446 ml    143 ml 


 


Output Urine Total 600 ml     


 


# Voids  3 3 1  


 


# Bowel Movements  0 0 0  








Result Diagram:  


12/11/17 0600                                                                  

              12/8/17 0650





Imaging


colonoscopy


Procedures





Current Medications








 Medications


  (Trade)  Dose


 Ordered  Sig/Lawrence


 Route  Start Time


 Stop Time Status Last Admin


 


  (NS Flush)  2 ml  UNSCH  PRN


 IV FLUSH  12/6/17 00:15


    12/11/17 12:01


 


 


  (NS Flush)  2 ml  BID


 IV FLUSH  12/6/17 09:00


    12/11/17 09:52


 


 


  (Narcan Inj)  0.4 mg  UNSCH  PRN


 IV PUSH  12/6/17 00:15


     


 


 


  (Coreg)  6.25 mg  TID


 PO  12/6/17 09:00


    12/11/17 09:51


 


 


  (Zetia)  10 mg  HS


 PO  12/6/17 21:00


    12/10/17 23:04


 


 


  (Pravachol)  40 mg  HS


 PO  12/6/17 21:00


    12/10/17 23:04


 


 


  (Catapres)  0.1 mg  Q6H  PRN


 PO  12/6/17 11:45


    12/10/17 23:15


 


 


  (Tylenol)  650 mg  Q4H  PRN


 PO  12/6/17 13:15


    12/9/17 23:12


 


 


  (Prevacid Odt)  30 mg  HS@2000


 PO  12/7/17 20:00


    12/10/17 23:04


 


 


  (Levsin)  0.125 mg  Q6H  PRN


 SL  12/7/17 19:45


     


 


 


  (Mylicon Chew)  80 mg  Q4H  PRN


 PO  12/7/17 20:15


    12/8/17 18:05


 


 


  (Norco  5-325 Mg)  1 tab  Q4H  PRN


 PO  12/8/17 07:00


    12/11/17 09:51


 


 


  (Carafate)  1 gm  ACHS


 PO  12/9/17 17:00


    12/11/17 12:00


 








Objective Remarks


Bleeding has apparently stopped and the source was not identified. He is being 

followed by GI and Surgery presently.


Medications and IVs





Current Medications








 Medications


  (Trade)  Dose


 Ordered  Sig/Lawrenec


 Route  Start Time


 Stop Time Status Last Admin


 


  (NS Flush)  2 ml  UNSCH  PRN


 IV FLUSH  12/6/17 00:15


    12/11/17 12:01


 


 


  (NS Flush)  2 ml  BID


 IV FLUSH  12/6/17 09:00


    12/11/17 09:52


 


 


  (Narcan Inj)  0.4 mg  UNSCH  PRN


 IV PUSH  12/6/17 00:15


     


 


 


  (Coreg)  6.25 mg  TID


 PO  12/6/17 09:00


    12/11/17 09:51


 


 


  (Zetia)  10 mg  HS


 PO  12/6/17 21:00


    12/10/17 23:04


 


 


  (Pravachol)  40 mg  HS


 PO  12/6/17 21:00


    12/10/17 23:04


 


 


  (Catapres)  0.1 mg  Q6H  PRN


 PO  12/6/17 11:45


    12/10/17 23:15


 


 


  (Tylenol)  650 mg  Q4H  PRN


 PO  12/6/17 13:15


    12/9/17 23:12


 


 


  (Prevacid Odt)  30 mg  HS@2000


 PO  12/7/17 20:00


    12/10/17 23:04


 


 


  (Levsin)  0.125 mg  Q6H  PRN


 SL  12/7/17 19:45


     


 


 


  (Mylicon Chew)  80 mg  Q4H  PRN


 PO  12/7/17 20:15


    12/8/17 18:05


 


 


  (Norco  5-325 Mg)  1 tab  Q4H  PRN


 PO  12/8/17 07:00


    12/11/17 09:51


 


 


  (Carafate)  1 gm  [facility]


 PO  12/9/17 17:00


    12/11/17 12:00


 











Assessment and Plan


Assessment and Plan


GI Bleeding - Resolved and source not identified. GI and Surgery following.


Discussed Condition With


Patient


Discharge Planning


Home when cleared by Baljit Maria Dec 11, 2017 13:01

## 2017-12-11 NOTE — HHI.GIFU
GI Follow-up Note


Consult Follow-up





Subjective:  Patient laying in bed-right sided abd present but better. No BM's 

or bleeding today





Objective:


PHYSICAL EXAMINATION:


Vitals signs stable


No fever





HEENT:  no jaundice.  


NECK: Neck is supple, no JVD, no lymphadenopathy.


CHEST:  Chest is clear to auscultation and percussion.


CARDIAC:  Regular rate and rhythm with no murmur gallop or rubs.


ABDOMEN:  Soft, nondistended, mild upper abd tenderness; no hepatosplenomegaly; 

bowel sounds are present in all four quadrants.


EXTREMITIES: No edema.


SKIN:  no rash; no jaundice.


CNS:  No focal deficits; alert and oriented times three.


Available Data (labs, X- Rays, Procedues) : 





CT-reviewed with pt 








ASSESSMENT/PLAN:





1. Lower GI bleeding/hematochezia -none today. A colonic ulcer was noted in the 

cecum-prob related to polypectomies. Other source of bleeding may be 

diverticulosis. Unfortunately prep was not the best and the other polypectomy 

sites and an AVM's could not be seen. Old blood was noted in the distal 

transverse colon/descending colon area


2. History of colon polyps, multiple


3. Colonic diverticulosis scattered throughout.


4. Right lower quadrant pain-returns


5. recent fall


6. Upper abd pain/GERD-better


7. Elevated LFT and lipase 


8. Cecal ulcer


9. Abnormal CT scan--dilated GB with thickened wall and dilated CBD


 


RECOMMENDATIONS


1. MRCP


2. Consult Surgery (Dr. Powell et al)


3. Stop Octreotide --bleeding has stopped and it is a "negative Gut hormone" 

and it can slow down the biliary tree


4. Full liquid diet


5. Carafate added-sometimes helps colonic ulcers





It was a pleasure seeing Jamshid Simon. Thank you for this consult.


Entered by: Alexandre Sandoval MD Dec 11, 2017 10:09

## 2017-12-11 NOTE — MB
cc:

VENU DIAZ M.D., SUNIL P. M.D.

****

 

 

DATE OF CONSULTATION:  12/11/2017

 

REASON FOR CONSULTATION

Distended gallbladder.

 

HISTORY OF PRESENT ILLNESS

This is a pleasant 77-year-old gentleman who has had numerous polypectomies in

the past and most recently about 2 weeks ago began experiencing some abdominal

discomfort.  This became more severe and he started having a fair amount of

bleeding.  He came to the hospital where a repeat  colonoscopy was done, did

not see a source of bleeding.  He improved somewhat but then a CT scan done

yesterday showed a distended gallbladder and a dilated common duct.  They did

not see any stones in the gallbladder.  Surgery was consulted for assistance in

management.  He is scheduled for an MRCP today or tomorrow.

 

His main complaint was diffuse abdominal pain which is dramatically improved.

Now he just has pain that is slight in the right upper quadrant.  He has not

ate well in the last 2 weeks.  He has been here in the hospital for a week.

 

REVIEW OF SYSTEMS

He has no shortness of breath or chest pain.  No neurologic deficits.  No

psychiatric illness.  No endocrine problems.  He has no chest pain or shortness

of breath.  The abdominal pain is somewhat subsided.

 

PAST MEDICAL HISTORY

1. Known coronary artery disease.

2. Hypertension.

3. Hyperlipidemia.

 

PAST SURGICAL HISTORY

1. Five-way bypass through a median sternotomy.

2. TURP.

3. Neck surgery.

4. Orthopedic surgery.

 

Dr. Botelol is his cardiologist.

 

PHYSICAL EXAMINATION

GENERAL:  On physical exam he is sitting up in bed.  He looks fairly

comfortably and is on oxygen.

NECK:  Supple without carotid bruits.  Trachea midline.

CHEST:  Clear.

HEART:  Regular rate.  He has a median sternotomy scar.

ABDOMEN:  Mild soreness in the right upper quadrant with deep palpation.  No

rebound or guarding.  No masses are appreciated.

EXTREMITIES:  Moves all extremities.  No clubbing, cyanosis or edema.

NEUROLOGIC:  Alert and oriented x3 without focal deficits.

 

LABORATORY DATA

Hemoglobin went down to 8 requiring 2 units of blood.  His white count today

is 7, hematocrit 28.

 

Chemistry shows slightly elevated liver enzymes, AST.  Total bilirubin is

normal; it was 0.3 and now is 0.8.  Albumin is 2.2.  Lipase two days ago was

498, now 154.

 

IMAGING STUDIES

Nuclear bleeding scan on 12/6/2017 did not show any bleeding.

 

On 12/8/2017 he had rib x-rays looking for a fracture in the right upper

quadrant and no fracture was identified.

 

Abdominal CT scan done yesterday shows distended gallbladder with a dilated

common duct, bilateral pleural effusions with atelectasis.

 

Reviewed the consultation from Dr. Meese and the notes from Dr. Camarena.

Colonoscopy by Dr. Camarena done during this hospitalization showed a

non-bleeding ulcer in the cecum, some hemorrhoids and some old blood with

diverticulosis.

 

ASSESSMENT

A 77-year-old gentleman who had numerous polypectomies, resulted in some

abdominal discomfort and lower GI bleed that has ceased.  There is some thought

this was related to aspirin therapy that he was taking.

 

He is getting an MRCP today to evaluate his ductal system.

 

He has never had any symptoms of biliary colic in the past.  I suspect his

gallbladder may be distended from being n.p.o. on and off for the last two

weeks and being ill.  At this time I would treat him conservatively, awaiting

the MRCP results.  Advised him he may or may not benefit from cholecystectomy

depending on his clinical status and the results of his MRCP.  Will follow

closely during this admission.

 

 

                              _________________________________

                               MD BRENNON Jacobson/SUSSY

D:  12/11/2017/11:07 AM

T:  12/11/2017/11:18 AM

Visit #:  [id number]

Job #:  [id number]

[email or web address]

## 2017-12-12 VITALS
HEART RATE: 57 BPM | DIASTOLIC BLOOD PRESSURE: 59 MMHG | SYSTOLIC BLOOD PRESSURE: 108 MMHG | RESPIRATION RATE: 18 BRPM | OXYGEN SATURATION: 96 % | TEMPERATURE: 96.5 F

## 2017-12-12 VITALS — HEART RATE: 75 BPM

## 2017-12-12 VITALS
TEMPERATURE: 96.9 F | HEART RATE: 79 BPM | SYSTOLIC BLOOD PRESSURE: 138 MMHG | RESPIRATION RATE: 18 BRPM | OXYGEN SATURATION: 97 % | DIASTOLIC BLOOD PRESSURE: 63 MMHG

## 2017-12-12 VITALS
SYSTOLIC BLOOD PRESSURE: 123 MMHG | OXYGEN SATURATION: 97 % | RESPIRATION RATE: 18 BRPM | TEMPERATURE: 96.7 F | HEART RATE: 72 BPM | DIASTOLIC BLOOD PRESSURE: 61 MMHG

## 2017-12-12 VITALS
RESPIRATION RATE: 18 BRPM | OXYGEN SATURATION: 98 % | TEMPERATURE: 96.4 F | HEART RATE: 62 BPM | SYSTOLIC BLOOD PRESSURE: 138 MMHG | DIASTOLIC BLOOD PRESSURE: 73 MMHG

## 2017-12-12 VITALS
SYSTOLIC BLOOD PRESSURE: 135 MMHG | OXYGEN SATURATION: 96 % | DIASTOLIC BLOOD PRESSURE: 65 MMHG | HEART RATE: 70 BPM | RESPIRATION RATE: 18 BRPM | TEMPERATURE: 96.9 F

## 2017-12-12 VITALS — HEART RATE: 73 BPM

## 2017-12-12 VITALS
SYSTOLIC BLOOD PRESSURE: 154 MMHG | OXYGEN SATURATION: 98 % | TEMPERATURE: 99.2 F | DIASTOLIC BLOOD PRESSURE: 67 MMHG | HEART RATE: 84 BPM | RESPIRATION RATE: 18 BRPM

## 2017-12-12 VITALS — HEART RATE: 68 BPM

## 2017-12-12 VITALS — HEART RATE: 62 BPM

## 2017-12-12 LAB
ALP SERPL-CCNC: 382 U/L (ref 45–117)
ALT SERPL-CCNC: 96 U/L (ref 12–78)
AMYLASE SERPL-CCNC: 47 U/L (ref 25–115)
ANION GAP SERPL CALC-SCNC: 6 MEQ/L (ref 5–15)
AST SERPL-CCNC: 97 U/L (ref 15–37)
BASOPHILS NFR BLD AUTO: 1 % (ref 0–2)
BILIRUB INDIRECT SERPL-MCNC: 0.3 MG/DL (ref 0–0.8)
BILIRUB SERPL-MCNC: 0.6 MG/DL (ref 0.2–1)
BUN SERPL-MCNC: 7 MG/DL (ref 7–18)
C. DIFF EPI 027: (no result)
CHLORIDE SERPL-SCNC: 102 MEQ/L (ref 98–107)
ERYTHROCYTE [DISTWIDTH] IN BLOOD BY AUTOMATED COUNT: 13.4 % (ref 11.6–17.2)
GFR SERPLBLD BASED ON 1.73 SQ M-ARVRAT: 129 ML/MIN (ref 89–?)
HCO3 BLD-SCNC: 29.4 MEQ/L (ref 21–32)
HCT VFR BLD CALC: 30.1 % (ref 39–51)
HEMO FLAGS: (no result)
MCH RBC QN AUTO: 31.3 PG (ref 27–34)
MCHC RBC AUTO-ENTMCNC: 34.7 % (ref 32–36)
MCV RBC AUTO: 90.1 FL (ref 80–100)
MYELOCYTES NFR BLD: 1 % (ref 0–0)
NEUTS BAND # BLD MANUAL: 6.2 TH/MM3 (ref 1.8–7.7)
NEUTS BAND NFR BLD: 18 % (ref 0–6)
NEUTS SEG NFR BLD MANUAL: 62 % (ref 16–70)
PLAT MORPH BLD: NORMAL
PLATELET # BLD: 195 TH/MM3 (ref 150–450)
PLATELET BLD QL SMEAR: NORMAL
POTASSIUM SERPL-SCNC: 3.1 MEQ/L (ref 3.5–5.1)
RBC # BLD AUTO: 3.34 MIL/MM3 (ref 4.5–5.9)
RBC MORPH BLD: NORMAL
SCAN/DIFF: (no result)
SODIUM SERPL-SCNC: 137 MEQ/L (ref 136–145)
WBC # BLD AUTO: 7.6 TH/MM3 (ref 4–11)
WBC DIFF SAMPLE: 100

## 2017-12-12 RX ADMIN — CARVEDILOL SCH MG: 6.25 TABLET, FILM COATED ORAL at 13:00

## 2017-12-12 RX ADMIN — SUCRALFATE SCH GM: 1 TABLET ORAL at 12:00

## 2017-12-12 RX ADMIN — CARVEDILOL SCH MG: 6.25 TABLET, FILM COATED ORAL at 18:51

## 2017-12-12 RX ADMIN — SUCRALFATE SCH GM: 1 TABLET ORAL at 08:00

## 2017-12-12 RX ADMIN — LANSOPRAZOLE SCH MG: 30 TABLET, ORALLY DISINTEGRATING, DELAYED RELEASE ORAL at 20:20

## 2017-12-12 RX ADMIN — ACETAMINOPHEN PRN MG: 325 TABLET ORAL at 20:20

## 2017-12-12 RX ADMIN — Medication SCH ML: at 20:20

## 2017-12-12 RX ADMIN — CARVEDILOL SCH MG: 6.25 TABLET, FILM COATED ORAL at 08:30

## 2017-12-12 RX ADMIN — Medication SCH ML: at 08:31

## 2017-12-12 RX ADMIN — SUCRALFATE SCH GM: 1 TABLET ORAL at 20:20

## 2017-12-12 RX ADMIN — SUCRALFATE SCH GM: 1 TABLET ORAL at 17:00

## 2017-12-12 RX ADMIN — PRAVASTATIN SODIUM SCH MG: 40 TABLET ORAL at 20:20

## 2017-12-12 RX ADMIN — EZETIMIBE SCH MG: 10 TABLET ORAL at 20:20

## 2017-12-12 RX ADMIN — ACETAMINOPHEN PRN MG: 325 TABLET ORAL at 08:31

## 2017-12-12 NOTE — HHI.PR
cc:   Alfredo Powell MD


__________________________________________________





Subjective


Subjective Notes


--------------------------------------------------------------------------------

-----------------------------------------------------


 PROGRESS NOTE FOR SURGICAL ATTENDING, DR. ALFREDO POWELL


--------------------------------------------------------------------------------

--------------------------------------------------------


Resting in bed 


Wife at bedside





Objective


Vitals/I&O





Vital Signs








  Date Time  Temp Pulse Resp B/P (MAP) Pulse Ox O2 Delivery O2 Flow Rate FiO2


 


12/12/17 08:34  75      


 


12/12/17 08:00 99.2  18 154/67 (96) 98   


 


12/12/17 07:31      Room Air  


 


12/11/17 20:24       2.00 








Labs





Laboratory Tests








Test


  12/12/17


07:10


 


White Blood Count 7.6 


 


Red Blood Count 3.34 


 


Hemoglobin 10.4 


 


Hematocrit 30.1 


 


Mean Corpuscular Volume 90.1 


 


Mean Corpuscular Hemoglobin 31.3 


 


Mean Corpuscular Hemoglobin


Concent 34.7 


 


 


Red Cell Distribution Width 13.4 


 


Platelet Count 195 


 


Mean Platelet Volume 9.2 


 


CBC Comment AUTO DIFF 


 


Differential Total Cells


Counted 100 


 


 


Neutrophils % (Manual) 62 


 


Band Neutrophils % 18 


 


Lymphocytes % 9 


 


Monocytes % 9 


 


Basophils % 1 


 


Neutrophils # (Manual) 6.2 


 


Myelocytes 1 


 


Differential Comment


  FINAL DIFF


MANUAL


 


Platelet Estimate NORMAL 


 


Platelet Morphology Comment NORMAL 


 


Red Cell Morphology Comment NORMAL 


 


Total Bilirubin 0.6 


 


Direct Bilirubin 0.3 


 


Indirect Bilirubin 0.3 


 


Aspartate Amino Transf


(AST/SGOT) 97 


 


 


Alanine Aminotransferase


(ALT/SGPT) 96 


 


 


Alkaline Phosphatase 382 


 


Total Protein 5.7 


 


Albumin 2.4 


 


Amylase Level 47 


 


Lipase 277 


 


Carcinoembryonic Antigen 0.9 








Radiology





Last Impressions








Cholangiopancreatography MRI 12/11/17 0000 Signed





Impressions: 





 Service Date/Time:  Monday, December 11, 2017 17:25 - CONCLUSION:  1. Probable 





 small 3 mm stone in distal common duct. Common bile duct is dilated to 13 mm. 





 There is also gallbladder wall thickening and some mild pericholecystic fluid. 





 2. Mild anasarca. 3. Small bilateral pleural effusions and basilar lung 





 consolidation.     Alfredo Scott MD 


 


Abdomen/Pelvis CT 12/10/17 0000 Signed





Impressions: 





 Service Date/Time:  Maulik, December 10, 2017 21:53 - CONCLUSION:  1. 

Distended 





 gallbladder with thickening of the gallbladder wall. Skin be correlated with 

any 





 clinical signs of cholecystitis. The common bile duct is dilated at 1.2 cm. 





 Calcified gallstones are not seen. 2. Mild bilateral pleural effusions. There 

is 





 some consolidation or atelectasis at the right lung base. 3. Scattered colonic 





 diverticula.     Rickey Islas MD 


 


Ribs X-Ray 12/8/17 0000 Signed





Impressions: 





 Service Date/Time:  Friday, December 8, 2017 09:57 - CONCLUSION:  1. There is 

no 





 evidence of acute fracture.       Gigi Eastman MD 


 


GI Bleed Scan Nuclear Medicine 12/6/17 0000 Signed





Impressions: 





 Service Date/Time:  Wednesday, December 6, 2017 14:32 - CONCLUSION:  1. 

Negative 





 gastrointestinal bleeding scan     Gigi Eastman MD 








Cardiovascular:  Regular


Lungs:  Clear


Abdomen:  Non-distended, Other (minimal RUQ abdominal pain )


Extremities:  No edema





A/P


Problem List:  


(1) Choledocholithiasis


ICD Codes:  K80.50 - Calculus of bile duct without cholangitis or cholecystitis 

without obstruction


Status:  Acute


Plan:   patient has ERCP planned tomorrow afternoon





(2) GI bleed


ICD Codes:  K92.2 - Gastrointestinal hemorrhage, unspecified


Status:  Chronic


(3) Abdominal pain


ICD Codes:  R10.9 - Unspecified abdominal pain


Status:  Chronic


Assessment and Plan


77 year old male s/p numerous polypectomies that resulted in a LGIB (now 

resolved); CT abd/pelvis shows gallbladder with a dilated common bile duct


 if MRCP shows distal common duct stones





-GI planning on ERCP tomorrow


-Will discuss timing of cholecystectomy based on findings on ERCP and clinical 

progression


 I told the patient he may benefit from just recovering from all the 

interventions that he has had get discharged from the hospital and follow up in 

my office for elective cholecystectomy depending on how the ERCP goes tomorrow





Attending Statement


--------------------------------------------------------------------------------

-----------------------------------------------------


 PROGRESS NOTE FOR SURGICAL ATTENDING, DR. ALFREDO POWELL


--------------------------------------------------------------------------------

--------------------------------------------------------


 I spoke with the patient and his wife at the bedside


still having some mild abdominal discomfort


he scheduled for an ERCP tomorrow














I agree with above assessment and plan.





The exam, history, and the medical decision-making described in the above note 

were completed with the assistance of the mid-level provider. I reviewed and 

agree with the findings presented. 











I attest that I had a face-to-face encounter with the patient on the same day, 

and personally performed and documented my assessment and findings in the 

medical record.





  





The following services were provided during this hospital visit:





   Chart data review, vital sign assessments/reviewing monitor data


   Review of consultations notes if present.


   Medication orders/review and/or management


   Ordering and/or reviewing lab tests


   Ordering and/or interpreting/reviewing x-rays and/or diagnostic studies 


   Care of the patient and discussion of the patient with the care team 


   Documentation time


   To help prompt me to consider important information that might be impacting 

today's encounter and assessment,


   information from prior notes written by myself or my colleagues may have 

been "brought forward/copy and pasted" into today's note.











Laina Bradley Dec 12, 2017 12:31


Alfredo Powell MD Dec 12, 2017 16:39

## 2017-12-12 NOTE — HHI.GIFU
GI Follow-up Note


Consult Follow-up





Subjective:  Patient feeling better. abd pain much better. some loose stools--

non-bloody





Objective:


PHYSICAL EXAMINATION:


Vitals signs stable


No fever





HEENT:  no jaundice.  


NECK: No lymphadenopathy.


CHEST:  Chest is clear to auscultation and percussion.


CARDIAC:  Regular rate and rhythm with no murmur gallop or rubs.


ABDOMEN:  Soft, nondistended, nontender; no hepatosplenomegaly; bowel sounds 

are present in all four quadrants.


EXTREMITIES: No  edema.


SKIN:no rash; no jaundice.


CNS:  alert and oriented times three.


Available Data (labs, X- Rays, Procedues) : 





MRCP -CBD stone. LFT's worse. Hgb 10.4-better








ASSESSMENT/PLAN:





1. Lower GI bleeding/hematochezia -none today. A colonic ulcer was noted in the 

cecum-prob related to polypectomies. Other source of bleeding may be 

diverticulosis. Unfortunately prep was not the best and the other polypectomy 

sites and an AVM's could not be seen. Old blood was noted in the distal 

transverse colon/descending colon area


2. History of colon polyps, multiple


3. Colonic diverticulosis scattered throughout.


4. Right lower quadrant pain-returns


5. recent fall


6. Upper abd pain/GERD-better


7. Elevated LFT and lipase 


8. Cecal ulcer


9. Abnormal CT scan--dilated GB with thickened wall and dilated CBD


10. CBD stone on MRCP


11. Loose stools


 


RECOMMENDATIONS


1. ERCP-all indications, risks, alternatives, benefits, limitations, 

complications were d/w pt/wife including bleeding, infection, perforation. 

pancreatitis, cholangitis and death .they agree. Dr. Trejo will do tomorrow


2. Stool for C. diff


3. Stop Octreotide --bleeding has stopped and it is a "negative Gut hormone" 

and it can slow down the biliary tree


4. Full liquid diet


5. Carafate added-sometimes helps colonic ulcers





It was a pleasure seeing Jamshid Simon. Thank you for this consult.


Entered by: Alexandre Sandoval MD Dec 12, 2017 10:07

## 2017-12-12 NOTE — HHI.PR
Subjective


Remarks


OOB sitting in chair.  Reports one loose stool yesterday.  Denies any nausea or 

vomiting.





Objective





Vital Signs








  Date Time  Temp Pulse Resp B/P (MAP) Pulse Ox O2 Delivery O2 Flow Rate FiO2


 


12/12/17 07:31      Room Air  


 


12/12/17 04:16 96.9 70 18 135/65 (88) 96   


 


12/12/17 04:05  68      


 


12/12/17 00:05  75      


 


12/11/17 23:20 96.7 81 18 183/87 (119) 97   


 


12/11/17 20:25 96.7 86 18 188/87 (120) 95   


 


12/11/17 20:24     95 Nasal Cannula 2.00 


 


12/11/17 20:24  80      


 


12/11/17 18:15 97.6 77 18 192/91 (124) 99   


 


12/11/17 16:13  67      


 


12/11/17 14:30  65      


 


12/11/17 12:00 96.0 64 18 109/64 (79) 91   














I/O      


 


 12/11/17 12/11/17 12/11/17 12/12/17 12/12/17 12/12/17





 07:00 15:00 23:00 07:00 15:00 23:00


 


Intake Total 240 ml 383 ml 360 ml  480 ml 


 


Balance 240 ml 383 ml 360 ml  480 ml 


 


      


 


Intake Oral 240 ml 240 ml 360 ml  480 ml 


 


IV Total  143 ml    


 


# Voids 1 3 2  1 


 


# Bowel Movements 0 1 0  0 








Result Diagram:  


12/11/17 0600                                                                  

              12/8/17 0650





Procedures





Current Medications








 Medications


  (Trade)  Dose


 Ordered  Sig/Lawrence


 Route  Start Time


 Stop Time Status Last Admin


 


  (NS Flush)  2 ml  UNSCH  PRN


 IV FLUSH  12/6/17 00:15


    12/11/17 12:01


 


 


  (NS Flush)  2 ml  BID


 IV FLUSH  12/6/17 09:00


    12/11/17 09:52


 


 


  (Narcan Inj)  0.4 mg  UNSCH  PRN


 IV PUSH  12/6/17 00:15


     


 


 


  (Coreg)  6.25 mg  TID


 PO  12/6/17 09:00


    12/11/17 09:51


 


 


  (Zetia)  10 mg  HS


 PO  12/6/17 21:00


    12/10/17 23:04


 


 


  (Pravachol)  40 mg  HS


 PO  12/6/17 21:00


    12/10/17 23:04


 


 


  (Catapres)  0.1 mg  Q6H  PRN


 PO  12/6/17 11:45


    12/10/17 23:15


 


 


  (Tylenol)  650 mg  Q4H  PRN


 PO  12/6/17 13:15


    12/9/17 23:12


 


 


  (Prevacid Odt)  30 mg  HS@2000


 PO  12/7/17 20:00


    12/10/17 23:04


 


 


  (Levsin)  0.125 mg  Q6H  PRN


 SL  12/7/17 19:45


     


 


 


  (Mylicon Chew)  80 mg  Q4H  PRN


 PO  12/7/17 20:15


    12/8/17 18:05


 


 


  (Norco  5-325 Mg)  1 tab  Q4H  PRN


 PO  12/8/17 07:00


    12/11/17 09:51


 


 


  (Carafate)  1 gm  ACHS


 PO  12/9/17 17:00


    12/11/17 12:00


 








Objective Remarks


GENERAL: alert and cooperative


SKIN: Warm and dry.


HEAD: Normocephalic.


EYES: No scleral icterus. No injection or drainage. 


NECK: Supple, trachea midline. No JVD or lymphadenopathy.


CARDIOVASCULAR: Regular rate and rhythm without murmurs, gallops, or rubs. 


RESPIRATORY: Breath sounds equal bilaterally. No accessory muscle use.


GASTROINTESTINAL: Abdomen soft, non-tender, nondistended. 


MUSCULOSKELETAL: No cyanosis, or edema. Tenderness palpated in mid back region


BACK: Nontender without obvious deformity. No CVA tenderness.





Medications and IVs





Current Medications








 Medications


  (Trade)  Dose


 Ordered  Sig/Lawrence


 Route  Start Time


 Stop Time Status Last Admin


 


  (NS Flush)  2 ml  UNSCH  PRN


 IV FLUSH  12/6/17 00:15


    12/11/17 12:01


 


 


  (NS Flush)  2 ml  BID


 IV FLUSH  12/6/17 09:00


    12/11/17 20:25


 


 


  (Narcan Inj)  0.4 mg  UNSCH  PRN


 IV PUSH  12/6/17 00:15


     


 


 


  (Coreg)  6.25 mg  TID


 PO  12/6/17 09:00


    12/11/17 18:41


 


 


  (Zetia)  10 mg  HS


 PO  12/6/17 21:00


    12/11/17 20:25


 


 


  (Pravachol)  40 mg  HS


 PO  12/6/17 21:00


    12/11/17 20:24


 


 


  (Catapres)  0.1 mg  Q6H  PRN


 PO  12/6/17 11:45


    12/12/17 00:35


 


 


  (Tylenol)  650 mg  Q4H  PRN


 PO  12/6/17 13:15


    12/9/17 23:12


 


 


  (Prevacid Odt)  30 mg  HS@2000


 PO  12/7/17 20:00


    12/11/17 20:24


 


 


  (Levsin)  0.125 mg  Q6H  PRN


 SL  12/7/17 19:45


     


 


 


  (Mylicon Chew)  80 mg  Q4H  PRN


 PO  12/7/17 20:15


    12/8/17 18:05


 


 


  (Norco  5-325 Mg)  1 tab  Q4H  PRN


 PO  12/8/17 07:00


    12/11/17 09:51


 


 


  (Carafate)  1 gm  ACHS


 PO  12/9/17 17:00


    12/11/17 20:24


 











Assessment and Plan


Problem List:  


(1) Hematochezia


ICD Codes:  K92.1 - Melena


Status:  Acute


(2) Abdominal pain


ICD Codes:  R10.9 - Unspecified abdominal pain


Assessment and Plan


12/06/17


Hematochezia:  GI consulted.  Clear liquid diet and sandostatin drip.  Frequent 

bloody stools.  Hemoglobin is stable at 15.7.  Colonscopy done 1 week ago with 

over 10 polyps removed.  Has had abdominal discomfort since that point was 

started on ciprofloxacin outpatient.  


HTN;  continue home medications well controlled on coreg


HLD:  continue home medications


Labs in am





12/07/07


Hematochezia:  GI consulted.  Bleeding scan ordered and negative scan for 

bleeding.  Continued on Clear liquid diet and sandostatin drip.  Bloody stools 

improved.  Hemoglobin is stable dropped to 9.6 today will transfuse as needed. 

Per GI note may be related to polypectomies.  


HTN;  elevated yesterday.  amlodipine added and prn clonidine


s/p fall:  fall reported per nursing.  Neuro checks every 4 hours.  Will order 

PT and OT.  Right shoulder and back discomfort reported that was relieved with 

tylenol





12/08/17 


Hematochezia:  Sandostatin drip stopped and diet advanced.  HGB decreased to 

8.3 from 9.6 yesterday.  Blood pressure is stable.  Recheck in AM.  Prevacid 

and simethicone started. 


S/P Fall:  complaining of back discomfort in mid back region will obtain rib 

xray.  Norco added for pain .  Will need to monitor carefully as patient is 

sensitive to pain medication.  


PT and OT started. 





12/12/17


Hematochezia:  None today.  Sandostatin drip stopped.  On full liquid diet.  

Denies any nausea or vomiting. A colonic ulcer was noted in the cecum-prob 

related to polypectomies. Other source of bleeding may be diverticulosis per GI 

note


Abdominal discomfort:  Non tender on palpation this morning.  Elevated LFT and 

lipase.  Pending today.  Abnormal CT scan--dilated GB with thickened wall and 

dilated CBD.  Surgery consulted per note gallbladder may be distended from 

being n.p.o. on and off for the last two weeks and being ill. MRCP-  Probable 

small 3 mm stone in distal common duct. Common bile duct is dilated to 13 mm. 

There is also gallbladder wall thickening and some mild pericholecystic fluid.





 


 








I and the ARNP have both examined this patient and reviewed this note and I 

agree with these findings and plan of care.  Gerald R Woodard DO Gellermann,Diane M. ARNP Dec 12, 2017 08:36

## 2017-12-13 VITALS
TEMPERATURE: 96.9 F | RESPIRATION RATE: 18 BRPM | OXYGEN SATURATION: 96 % | SYSTOLIC BLOOD PRESSURE: 187 MMHG | DIASTOLIC BLOOD PRESSURE: 84 MMHG | HEART RATE: 81 BPM

## 2017-12-13 VITALS — HEART RATE: 72 BPM

## 2017-12-13 VITALS — HEART RATE: 63 BPM

## 2017-12-13 VITALS
TEMPERATURE: 96.6 F | HEART RATE: 71 BPM | SYSTOLIC BLOOD PRESSURE: 161 MMHG | OXYGEN SATURATION: 94 % | DIASTOLIC BLOOD PRESSURE: 81 MMHG | RESPIRATION RATE: 18 BRPM

## 2017-12-13 VITALS — OXYGEN SATURATION: 98 %

## 2017-12-13 VITALS
SYSTOLIC BLOOD PRESSURE: 162 MMHG | OXYGEN SATURATION: 93 % | HEART RATE: 71 BPM | RESPIRATION RATE: 18 BRPM | DIASTOLIC BLOOD PRESSURE: 80 MMHG | TEMPERATURE: 97.5 F

## 2017-12-13 VITALS
RESPIRATION RATE: 18 BRPM | DIASTOLIC BLOOD PRESSURE: 85 MMHG | HEART RATE: 69 BPM | SYSTOLIC BLOOD PRESSURE: 184 MMHG | TEMPERATURE: 95.6 F | OXYGEN SATURATION: 98 %

## 2017-12-13 VITALS
RESPIRATION RATE: 18 BRPM | OXYGEN SATURATION: 96 % | TEMPERATURE: 96.9 F | DIASTOLIC BLOOD PRESSURE: 66 MMHG | SYSTOLIC BLOOD PRESSURE: 147 MMHG | HEART RATE: 63 BPM

## 2017-12-13 LAB
ANION GAP SERPL CALC-SCNC: 7 MEQ/L (ref 5–15)
BUN SERPL-MCNC: 6 MG/DL (ref 7–18)
CHLORIDE SERPL-SCNC: 106 MEQ/L (ref 98–107)
GFR SERPLBLD BASED ON 1.73 SQ M-ARVRAT: 165 ML/MIN (ref 89–?)
HCO3 BLD-SCNC: 28.9 MEQ/L (ref 21–32)
POTASSIUM SERPL-SCNC: 3.3 MEQ/L (ref 3.5–5.1)
SODIUM SERPL-SCNC: 142 MEQ/L (ref 136–145)

## 2017-12-13 PROCEDURE — BF101ZZ FLUOROSCOPY OF BILE DUCTS USING LOW OSMOLAR CONTRAST: ICD-10-PCS

## 2017-12-13 PROCEDURE — 0FC98ZZ EXTIRPATION OF MATTER FROM COMMON BILE DUCT, VIA NATURAL OR ARTIFICIAL OPENING ENDOSCOPIC: ICD-10-PCS | Performed by: INTERNAL MEDICINE

## 2017-12-13 RX ADMIN — EZETIMIBE SCH MG: 10 TABLET ORAL at 20:01

## 2017-12-13 RX ADMIN — LANSOPRAZOLE SCH MG: 30 TABLET, ORALLY DISINTEGRATING, DELAYED RELEASE ORAL at 20:01

## 2017-12-13 RX ADMIN — Medication SCH ML: at 20:02

## 2017-12-13 RX ADMIN — DEXTROSE MONOHYDRATE, SODIUM CHLORIDE, AND POTASSIUM CHLORIDE SCH MLS/HR: 50; 9; 1.49 INJECTION, SOLUTION INTRAVENOUS at 09:09

## 2017-12-13 RX ADMIN — PRAVASTATIN SODIUM SCH MG: 40 TABLET ORAL at 20:01

## 2017-12-13 RX ADMIN — SIMETHICONE CHEW TAB 80 MG PRN MG: 80 TABLET ORAL at 20:01

## 2017-12-13 RX ADMIN — SUCRALFATE SCH GM: 1 TABLET ORAL at 17:00

## 2017-12-13 RX ADMIN — CARVEDILOL SCH MG: 6.25 TABLET, FILM COATED ORAL at 18:00

## 2017-12-13 RX ADMIN — SUCRALFATE SCH GM: 1 TABLET ORAL at 20:01

## 2017-12-13 RX ADMIN — HYDROCODONE BITARTRATE AND ACETAMINOPHEN PRN TAB: 5; 325 TABLET ORAL at 21:45

## 2017-12-13 RX ADMIN — ACETAMINOPHEN PRN MG: 325 TABLET ORAL at 22:40

## 2017-12-13 RX ADMIN — SUCRALFATE SCH GM: 1 TABLET ORAL at 08:00

## 2017-12-13 RX ADMIN — CARVEDILOL SCH MG: 6.25 TABLET, FILM COATED ORAL at 12:33

## 2017-12-13 RX ADMIN — Medication SCH ML: at 09:09

## 2017-12-13 RX ADMIN — CARVEDILOL SCH MG: 6.25 TABLET, FILM COATED ORAL at 09:08

## 2017-12-13 RX ADMIN — SUCRALFATE SCH GM: 1 TABLET ORAL at 11:43

## 2017-12-13 NOTE — EKG
Date Performed: 12/13/2017       Time Performed: 07:15:50

 

PTAGE:      77 years

 

EKG:      Sinus rhythm 

 

 WITH OCCASIONAL VENTRICULAR PREMATURE COMPLEXES POSSIBLE LEFT ATRIAL ENLARGEMENT BORDERLINE LEFT AXI
S DEVIATION INTRAVENTRICULAR CONDUCTION DELAY ABNORMAL ECG

 

PREVIOUS TRACING       : 05/15/2015 14.22

 

DOCTOR:   Hussein Molina  Interpretating Date/Time  12/13/2017 14:59:20

## 2017-12-13 NOTE — HHI.PR
Subjective


Remarks


Patient resting in bed this morning.  Plan for ERCP today





Objective





Vital Signs








  Date Time  Temp Pulse Resp B/P (MAP) Pulse Ox O2 Delivery O2 Flow Rate FiO2


 


12/13/17 04:00  63      


 


12/13/17 03:45 96.9 63 18 147/66 (93) 96   


 


12/12/17 23:49  62      


 


12/12/17 23:27 96.9 79 18 138/63 (88) 97   


 


12/12/17 20:17  73      


 


12/12/17 20:17     97 Nasal Cannula 2.00 


 


12/12/17 19:45 96.7 72 18 123/61 (81) 97   


 


12/12/17 17:51 96.4 62 18 138/73 (94) 98   


 


12/12/17 12:00 96.5 57 18 108/59 (75) 96   


 


12/12/17 08:34  75      














I/O      


 


 12/12/17 12/12/17 12/12/17 12/13/17 12/13/17 12/13/17





 07:00 15:00 23:00 07:00 15:00 23:00


 


Intake Total  1080 ml 480 ml 0 ml  


 


Output Total  3 ml  450 ml  


 


Balance  1077 ml 480 ml -450 ml  


 


      


 


Intake Oral  1080 ml 480 ml 0 ml  


 


Output Urine Total  3 ml  450 ml  


 


# Voids  1 1 1  


 


# Bowel Movements  0 0 0  








Result Diagram:  


12/12/17 0710                                                                  

              12/12/17 0710





Procedures





Current Medications








 Medications


  (Trade)  Dose


 Ordered  Sig/Lawrence


 Route  Start Time


 Stop Time Status Last Admin


 


  (NS Flush)  2 ml  UNSCH  PRN


 IV FLUSH  12/6/17 00:15


    12/11/17 12:01


 


 


  (NS Flush)  2 ml  BID


 IV FLUSH  12/6/17 09:00


    12/11/17 09:52


 


 


  (Narcan Inj)  0.4 mg  UNSCH  PRN


 IV PUSH  12/6/17 00:15


     


 


 


  (Coreg)  6.25 mg  TID


 PO  12/6/17 09:00


    12/11/17 09:51


 


 


  (Zetia)  10 mg  HS


 PO  12/6/17 21:00


    12/10/17 23:04


 


 


  (Pravachol)  40 mg  HS


 PO  12/6/17 21:00


    12/10/17 23:04


 


 


  (Catapres)  0.1 mg  Q6H  PRN


 PO  12/6/17 11:45


    12/10/17 23:15


 


 


  (Tylenol)  650 mg  Q4H  PRN


 PO  12/6/17 13:15


    12/9/17 23:12


 


 


  (Prevacid Odt)  30 mg  HS@2000


 PO  12/7/17 20:00


    12/10/17 23:04


 


 


  (Levsin)  0.125 mg  Q6H  PRN


 SL  12/7/17 19:45


     


 


 


  (Mylicon Chew)  80 mg  Q4H  PRN


 PO  12/7/17 20:15


    12/8/17 18:05


 


 


  (Norco  5-325 Mg)  1 tab  Q4H  PRN


 PO  12/8/17 07:00


    12/11/17 09:51


 


 


  (Carafate)  1 gm  ACHS


 PO  12/9/17 17:00


    12/11/17 12:00


 








Objective Remarks


GENERAL: alert and cooperative


SKIN: Warm and dry.


HEAD: Normocephalic.


EYES: No scleral icterus. No injection or drainage. 


NECK: Supple, trachea midline. No JVD or lymphadenopathy.


CARDIOVASCULAR: Regular rate and rhythm without murmurs, gallops, or rubs. 


RESPIRATORY: Breath sounds equal bilaterally. No accessory muscle use.


GASTROINTESTINAL: Abdomen soft, non-tender, nondistended. 


MUSCULOSKELETAL: No cyanosis, or edema. Tenderness palpated in mid back region


BACK: Nontender without obvious deformity. No CVA tenderness.





Medications and IVs





Current Medications








 Medications


  (Trade)  Dose


 Ordered  Sig/Lawrence


 Route  Start Time


 Stop Time Status Last Admin


 


  (NS Flush)  2 ml  UNSCH  PRN


 IV FLUSH  12/6/17 00:15


    12/11/17 12:01


 


 


  (NS Flush)  2 ml  BID


 IV FLUSH  12/6/17 09:00


    12/12/17 20:20


 


 


  (Narcan Inj)  0.4 mg  UNSCH  PRN


 IV PUSH  12/6/17 00:15


     


 


 


  (Coreg)  6.25 mg  TID


 PO  12/6/17 09:00


    12/12/17 18:51


 


 


  (Zetia)  10 mg  HS


 PO  12/6/17 21:00


    12/12/17 20:20


 


 


  (Pravachol)  40 mg  HS


 PO  12/6/17 21:00


    12/12/17 20:20


 


 


  (Catapres)  0.1 mg  Q6H  PRN


 PO  12/6/17 11:45


    12/12/17 00:35


 


 


  (Tylenol)  650 mg  Q4H  PRN


 PO  12/6/17 13:15


    12/12/17 20:20


 


 


  (Prevacid Odt)  30 mg  HS@2000


 PO  12/7/17 20:00


    12/12/17 20:20


 


 


  (Levsin)  0.125 mg  Q6H  PRN


 SL  12/7/17 19:45


     


 


 


  (Mylicon Chew)  80 mg  Q4H  PRN


 PO  12/7/17 20:15


    12/8/17 18:05


 


 


  (Norco  5-325 Mg)  1 tab  Q4H  PRN


 PO  12/8/17 07:00


    12/11/17 09:51


 


 


  (Carafate)  1 gm  ACHS


 PO  12/9/17 17:00


    12/12/17 20:20


 


 


 Lactated Ringer's  1,000 ml @ 


 30 mls/hr  Q24H PRN


 IV  12/12/17 23:45


 12/15/17 23:44   


 


 


 Sodium Chloride  500 ml @ 


 30 mls/hr  [id number] PRN


 IV  12/12/17 23:45


 12/15/17 23:44   


 


 


  (Betadine 5%


 Antisepsis Kit)  1 applic  ON CALL  PRN


 EACH NARE  12/12/17 23:45


 12/15/17 23:44   


 


 


  (Chlorhexidine


 2% Cloth)  3 pack  ON CALL  PRN


 TOPICAL  12/12/17 23:45


 12/15/17 23:44   


 


 


 Potassium


 Chloride/Dextrose/


 Sod Cl  1,000 ml @ 


 84 mls/hr  [id number]


 IV  12/13/17 08:00


   UNV  


 











Assessment and Plan


Problem List:  


(1) Hematochezia


ICD Codes:  K92.1 - Melena


Status:  Acute


(2) Abdominal pain


ICD Codes:  R10.9 - Unspecified abdominal pain


Status:  Chronic


Assessment and Plan


12/06/17


Hematochezia:  GI consulted.  Clear liquid diet and sandostatin drip.  Frequent 

bloody stools.  Hemoglobin is stable at 15.7.  Colonscopy done 1 week ago with 

over 10 polyps removed.  Has had abdominal discomfort since that point was 

started on ciprofloxacin outpatient.  


HTN;  continue home medications well controlled on coreg


HLD:  continue home medications


Labs in am





12/07/07


Hematochezia:  GI consulted.  Bleeding scan ordered and negative scan for 

bleeding.  Continued on Clear liquid diet and sandostatin drip.  Bloody stools 

improved.  Hemoglobin is stable dropped to 9.6 today will transfuse as needed. 

Per GI note may be related to polypectomies.  


HTN;  elevated yesterday.  amlodipine added and prn clonidine


s/p fall:  fall reported per nursing.  Neuro checks every 4 hours.  Will order 

PT and OT.  Right shoulder and back discomfort reported that was relieved with 

tylenol





12/08/17 


Hematochezia:  Sandostatin drip stopped and diet advanced.  HGB decreased to 

8.3 from 9.6 yesterday.  Blood pressure is stable.  Recheck in AM.  Prevacid 

and simethicone started. 


S/P Fall:  complaining of back discomfort in mid back region will obtain rib 

xray.  Norco added for pain .  Will need to monitor carefully as patient is 

sensitive to pain medication.  


PT and OT started. 





12/12/17


Hematochezia:  None today.  Sandostatin drip stopped.  On full liquid diet.  

Denies any nausea or vomiting. A colonic ulcer was noted in the cecum-prob 

related to polypectomies. Other source of bleeding may be diverticulosis per GI 

note


Abdominal discomfort:  Non tender on palpation this morning.  Elevated LFT and 

lipase.  Pending today.  Abnormal CT scan--dilated GB with thickened wall and 

dilated CBD.  Surgery consulted per note gallbladder may be distended from 

being n.p.o. on and off for the last two weeks and being ill. MRCP-  Probable 

small 3 mm stone in distal common duct. Common bile duct is dilated to 13 mm. 

There is also gallbladder wall thickening and some mild pericholecystic fluid.





12/13/17


Abdominal discomfort:  ERCP planned for today.  Patient is NPO.  IVF's ordered. 


Anemia;  HGB stable will recheck in AM


Hypokalemia: k yesterday 3.1.  replacement given recheck in AM


 








I and the ARNP have both examined this patient and reviewed this note and I 

agree with these findings and plan of care.  Jose A Mendenhall DO/











Gellermann,Diane M. [facility] Dec 13, 2017 08:28

## 2017-12-13 NOTE — GIPROC
[facility]

[address] [facility]. [facility] [place], [address]

 

 

ERCP PROCEDURE REPORT        EXAM DATE: 12/13/2017

 

PATIENT NAME:      Jamshid Simon           MR #:      [id number]

YOB: 1940      VISIT #:     [id number]

ATTENDING:     Rose Mary Trejo MD     ORDER #:     [id number]

ASSISTANT:      Bria Bernal and Deborah Stoner     STATUS:

inpatient

 

INDICATIONS:  The patient is a 77 yr old male here for an ERCP due to therapy of

bile duct stone(s)

PROCEDURE PERFORMED:     ERCP with sphincterotomy/papillotomy

ERCP with removal of calculus/calculi

MEDICATIONS:     None and Per Anesthesia.

 

CONSENT: The patient understands the risks and benefits of the procedure and

understands that these risks include, but are not limited to: sedation,

allergic reaction, infection, perforation and/or bleeding. Alternative means of

evaluation and treatment include, among others: physical exam, x-rays, and/or

surgical intervention. The patient elects to proceed with this endoscopic

procedure.

 



medical equipment was checked for proper function. Hand hygiene and appropriate

measures for infection prevention was taken. After the risks, benefits and

alternatives of the procedure were thoroughly explained, Informed was verified,

confirmed and timeout was successfully executed by the treatment team. With the

patient in left semi-prone position, medications were administered

intravenously.The Pentax [id number] was passed from the mouth into the esophagus

and further advanced from the esophagus into the stomach. From stomach scope

was directed to the second portion of the duodenum.  Major papilla was aligned

with the duodenoscope. The scope position was confirmed fluoroscopically. Rest

of the findings/therapeutics are given below. The scope was then completely

withdrawn from the patient and the procedure completed. The pulse, BP, and O2

saturation were monitored and documented by the physician and the nursing staff

throughout the entire procedure. The patient was cared for as planned according

to standard protocol. The patient was then discharged to recovery in stable

condition and with appropriate post procedure care.

 

The ampulla was located the second portion of the duodenum.  The ampulla

appeared normal.   There was a dilation of the CBD up to 12mm.   Two  possible

filling defects were seen.   Bile duct cannulation was attempted using the

sphinctertome with guidewire.  Cannulation of the bile duct was performed with

ease.  Deep cannulation with the sphincterotome was successfully achieved.

Contrast was injected into the bile duct and a cholangiogram obtained.   With

guidewire in the bile duct, a biliary sphincterotomy was performed using the

sphincterotome.   Using a stone extraction balloon the bile duct was swept

several times.  Multiple stone fragments were removed from the bile duct

successfully, and sludge was removed..   A balloon occlusion cholangiogram was

performed.  No filling defects were noted.  Cystic duct was patent.

Gallbladder filed with contrast.

 

 

 

ADVERSE EVENT:     There were no complications.

IMPRESSIONS:     1.  Normal appearing ampulla

2.  Dilated common bile duct

3.  S/p sphincterotomy and extract.

 

RECOMMENDATIONS:     1.  Return to hospital floor.

2.  Resume diet as tolerated

3.  Monitor LFTs as needed

4.  Can consider outpt maria esther

REPEAT EXAM:     NONE

 

 

___________________________________

Rose Mary Trejo MD

eSigned:  Rose Mary Trejo MD 12/13/2017 6:27 PM

 

 

cc:

## 2017-12-14 VITALS
HEART RATE: 82 BPM | SYSTOLIC BLOOD PRESSURE: 138 MMHG | OXYGEN SATURATION: 96 % | TEMPERATURE: 99.1 F | RESPIRATION RATE: 17 BRPM | DIASTOLIC BLOOD PRESSURE: 60 MMHG

## 2017-12-14 VITALS
RESPIRATION RATE: 17 BRPM | OXYGEN SATURATION: 93 % | HEART RATE: 88 BPM | SYSTOLIC BLOOD PRESSURE: 143 MMHG | DIASTOLIC BLOOD PRESSURE: 76 MMHG | TEMPERATURE: 96.6 F

## 2017-12-14 VITALS
DIASTOLIC BLOOD PRESSURE: 60 MMHG | SYSTOLIC BLOOD PRESSURE: 129 MMHG | OXYGEN SATURATION: 95 % | HEART RATE: 77 BPM | TEMPERATURE: 99 F | RESPIRATION RATE: 17 BRPM

## 2017-12-14 VITALS
HEART RATE: 74 BPM | RESPIRATION RATE: 18 BRPM | TEMPERATURE: 97 F | DIASTOLIC BLOOD PRESSURE: 56 MMHG | OXYGEN SATURATION: 98 % | SYSTOLIC BLOOD PRESSURE: 115 MMHG

## 2017-12-14 VITALS
HEART RATE: 73 BPM | DIASTOLIC BLOOD PRESSURE: 70 MMHG | SYSTOLIC BLOOD PRESSURE: 126 MMHG | TEMPERATURE: 96.8 F | RESPIRATION RATE: 18 BRPM | OXYGEN SATURATION: 97 %

## 2017-12-14 VITALS
OXYGEN SATURATION: 96 % | SYSTOLIC BLOOD PRESSURE: 126 MMHG | HEART RATE: 76 BPM | TEMPERATURE: 99.1 F | DIASTOLIC BLOOD PRESSURE: 61 MMHG | RESPIRATION RATE: 17 BRPM

## 2017-12-14 VITALS — HEART RATE: 76 BPM

## 2017-12-14 VITALS — OXYGEN SATURATION: 95 %

## 2017-12-14 LAB
ANION GAP SERPL CALC-SCNC: 7 MEQ/L (ref 5–15)
BUN SERPL-MCNC: 5 MG/DL (ref 7–18)
CHLORIDE SERPL-SCNC: 106 MEQ/L (ref 98–107)
ERYTHROCYTE [DISTWIDTH] IN BLOOD BY AUTOMATED COUNT: 13.7 % (ref 11.6–17.2)
GFR SERPLBLD BASED ON 1.73 SQ M-ARVRAT: 169 ML/MIN (ref 89–?)
HCO3 BLD-SCNC: 27.6 MEQ/L (ref 21–32)
HCT VFR BLD CALC: 30.4 % (ref 39–51)
MCH RBC QN AUTO: 30.2 PG (ref 27–34)
MCHC RBC AUTO-ENTMCNC: 33.4 % (ref 32–36)
MCV RBC AUTO: 90.4 FL (ref 80–100)
PLATELET # BLD: 248 TH/MM3 (ref 150–450)
POTASSIUM SERPL-SCNC: 3.3 MEQ/L (ref 3.5–5.1)
RBC # BLD AUTO: 3.36 MIL/MM3 (ref 4.5–5.9)
REVIEW FLAG: (no result)
SODIUM SERPL-SCNC: 141 MEQ/L (ref 136–145)
WBC # BLD AUTO: 10.5 TH/MM3 (ref 4–11)

## 2017-12-14 RX ADMIN — CARVEDILOL SCH MG: 6.25 TABLET, FILM COATED ORAL at 09:07

## 2017-12-14 RX ADMIN — Medication SCH MG: at 12:06

## 2017-12-14 RX ADMIN — SUCRALFATE SCH GM: 1 TABLET ORAL at 17:54

## 2017-12-14 RX ADMIN — PRAVASTATIN SODIUM SCH MG: 40 TABLET ORAL at 20:48

## 2017-12-14 RX ADMIN — SUCRALFATE SCH GM: 1 TABLET ORAL at 20:48

## 2017-12-14 RX ADMIN — CARVEDILOL SCH MG: 6.25 TABLET, FILM COATED ORAL at 12:07

## 2017-12-14 RX ADMIN — Medication SCH MG: at 20:48

## 2017-12-14 RX ADMIN — SUCRALFATE SCH GM: 1 TABLET ORAL at 12:07

## 2017-12-14 RX ADMIN — DEXTROSE MONOHYDRATE, SODIUM CHLORIDE, AND POTASSIUM CHLORIDE SCH MLS/HR: 50; 9; 1.49 INJECTION, SOLUTION INTRAVENOUS at 00:13

## 2017-12-14 RX ADMIN — POTASSIUM CHLORIDE SCH MEQ: 20 TABLET, EXTENDED RELEASE ORAL at 20:47

## 2017-12-14 RX ADMIN — Medication SCH ML: at 20:49

## 2017-12-14 RX ADMIN — LANSOPRAZOLE SCH MG: 30 TABLET, ORALLY DISINTEGRATING, DELAYED RELEASE ORAL at 20:48

## 2017-12-14 RX ADMIN — EZETIMIBE SCH MG: 10 TABLET ORAL at 20:48

## 2017-12-14 RX ADMIN — CARVEDILOL SCH MG: 6.25 TABLET, FILM COATED ORAL at 17:53

## 2017-12-14 RX ADMIN — Medication SCH ML: at 09:07

## 2017-12-14 RX ADMIN — SUCRALFATE SCH GM: 1 TABLET ORAL at 09:07

## 2017-12-14 RX ADMIN — POTASSIUM CHLORIDE SCH MEQ: 20 TABLET, EXTENDED RELEASE ORAL at 12:05

## 2017-12-14 NOTE — HHI.PR
Subjective


Remarks


Patient resting in bed.  Reported last night he had abdominal radiating to back 

pain after ERCP.  Has resolved this morning.





Objective





Vital Signs








  Date Time  Temp Pulse Resp B/P (MAP) Pulse Ox O2 Delivery O2 Flow Rate FiO2


 


12/14/17 04:10 96.8 73 18 126/70 (88) 97   


 


12/14/17 00:10 97.0 74 18 115/56 (75) 98   


 


12/13/17 21:44     98   21


 


12/13/17 20:00 95.6 69 18 184/85 (118) 98   


 


12/13/17 19:01  62 16 173/72 (105) 95 Room Air  


 


12/13/17 18:40  67 16 164/79 (107) 94 Room Air  


 


12/13/17 18:25 98.2 72 16 167/79 (108) 95 Room Air  


 


12/13/17 16:00 96.9 81 18 187/84 (118) 96   


 


12/13/17 12:00 97.5 71 18 162/80 (107) 93   


 


12/13/17 09:04  72      














I/O      


 


 12/13/17 12/13/17 12/13/17 12/14/17 12/14/17 12/14/17





 07:00 15:00 23:00 07:00 15:00 23:00


 


Intake Total 0 ml  780 ml 240 ml  


 


Output Total 450 ml  400 ml   


 


Balance -450 ml  380 ml 240 ml  


 


      


 


Intake Oral 0 ml  480 ml 240 ml  


 


Other   300 ml   


 


Output Urine Total 450 ml  400 ml   


 


# Voids 1   1  


 


# Bowel Movements 0  1 1  








Result Diagram:  


12/14/17 0650                                                                  

              12/13/17 1037





Procedures





Current Medications








 Medications


  (Trade)  Dose


 Ordered  Sig/Lawrence


 Route  Start Time


 Stop Time Status Last Admin


 


  (NS Flush)  2 ml  UNSCH  PRN


 IV FLUSH  12/6/17 00:15


    12/11/17 12:01


 


 


  (NS Flush)  2 ml  BID


 IV FLUSH  12/6/17 09:00


    12/11/17 09:52


 


 


  (Narcan Inj)  0.4 mg  UNSCH  PRN


 IV PUSH  12/6/17 00:15


     


 


 


  (Coreg)  6.25 mg  TID


 PO  12/6/17 09:00


    12/11/17 09:51


 


 


  (Zetia)  10 mg  HS


 PO  12/6/17 21:00


    12/10/17 23:04


 


 


  (Pravachol)  40 mg  HS


 PO  12/6/17 21:00


    12/10/17 23:04


 


 


  (Catapres)  0.1 mg  Q6H  PRN


 PO  12/6/17 11:45


    12/10/17 23:15


 


 


  (Tylenol)  650 mg  Q4H  PRN


 PO  12/6/17 13:15


    12/9/17 23:12


 


 


  (Prevacid Odt)  30 mg  HS@2000


 PO  12/7/17 20:00


    12/10/17 23:04


 


 


  (Levsin)  0.125 mg  Q6H  PRN


 SL  12/7/17 19:45


     


 


 


  (Mylicon Chew)  80 mg  Q4H  PRN


 PO  12/7/17 20:15


    12/8/17 18:05


 


 


  (Norco  5-325 Mg)  1 tab  Q4H  PRN


 PO  12/8/17 07:00


    12/11/17 09:51


 


 


  (Carafate)  1 gm  ACHS


 PO  12/9/17 17:00


    12/11/17 12:00


 








Objective Remarks


GENERAL: alert and cooperative


SKIN: Warm and dry.


HEAD: Normocephalic.


EYES: No scleral icterus. No injection or drainage. 


NECK: Supple, trachea midline. No JVD or lymphadenopathy.


CARDIOVASCULAR: Regular rate and rhythm without murmurs, gallops, or rubs. 


RESPIRATORY: Breath sounds equal bilaterally. No accessory muscle use.


GASTROINTESTINAL: Abdomen soft, non-tender, nondistended. 


MUSCULOSKELETAL: No cyanosis, or edema. Tenderness palpated in mid back region


BACK: Nontender without obvious deformity. No CVA tenderness.





Medications and IVs





Current Medications








 Medications


  (Trade)  Dose


 Ordered  Sig/Lawrence


 Route  Start Time


 Stop Time Status Last Admin


 


  (NS Flush)  2 ml  UNSCH  PRN


 IV FLUSH  12/6/17 00:15


    12/11/17 12:01


 


 


  (NS Flush)  2 ml  BID


 IV FLUSH  12/6/17 09:00


    12/13/17 20:02


 


 


  (Narcan Inj)  0.4 mg  UNSCH  PRN


 IV PUSH  12/6/17 00:15


     


 


 


  (Coreg)  6.25 mg  TID


 PO  12/6/17 09:00


    12/13/17 12:33


 


 


  (Zetia)  10 mg  HS


 PO  12/6/17 21:00


    12/13/17 20:01


 


 


  (Pravachol)  40 mg  HS


 PO  12/6/17 21:00


    12/13/17 20:01


 


 


  (Catapres)  0.1 mg  Q6H  PRN


 PO  12/6/17 11:45


    12/13/17 20:01


 


 


  (Tylenol)  650 mg  Q4H  PRN


 PO  12/6/17 13:15


    12/13/17 22:40


 


 


  (Prevacid Odt)  30 mg  HS@2000


 PO  12/7/17 20:00


    12/13/17 20:01


 


 


  (Levsin)  0.125 mg  Q6H  PRN


 SL  12/7/17 19:45


     


 


 


  (Mylicon Chew)  80 mg  Q4H  PRN


 PO  12/7/17 20:15


    12/13/17 20:01


 


 


  (Norco  5-325 Mg)  1 tab  Q4H  PRN


 PO  12/8/17 07:00


    12/13/17 21:45


 


 


  (Carafate)  1 gm  ACHS


 PO  12/9/17 17:00


    12/13/17 20:01


 


 


  (Betadine 5%


 Antisepsis Kit)  1 applic  ON CALL  PRN


 EACH NARE  12/12/17 23:45


 12/15/17 23:44   


 


 


  (Chlorhexidine


 2% Cloth)  3 pack  ON CALL  PRN


 TOPICAL  12/12/17 23:45


 12/15/17 23:44   


 


 


 Potassium


 Chloride/Dextrose/


 Sod Cl  1,000 ml @ 


 84 mls/hr  [id number]


 IV  12/13/17 08:00


    12/14/17 00:13


 


 


 Miscellaneous


 Information  ALL


 NURSING


 DEPARTME...  UNSCH  PRN


 .XX  12/13/17 19:45


 12/14/17 19:44   


 











Assessment and Plan


Problem List:  


(1) Hematochezia


ICD Codes:  K92.1 - Melena


Status:  Acute


(2) Abdominal pain


ICD Codes:  R10.9 - Unspecified abdominal pain


Status:  Chronic


Assessment and Plan


12/06/17


Hematochezia:  GI consulted.  Clear liquid diet and sandostatin drip.  Frequent 

bloody stools.  Hemoglobin is stable at 15.7.  Colonscopy done 1 week ago with 

over 10 polyps removed.  Has had abdominal discomfort since that point was 

started on ciprofloxacin outpatient.  


HTN;  continue home medications well controlled on coreg


HLD:  continue home medications


Labs in am





12/07/07


Hematochezia:  GI consulted.  Bleeding scan ordered and negative scan for 

bleeding.  Continued on Clear liquid diet and sandostatin drip.  Bloody stools 

improved.  Hemoglobin is stable dropped to 9.6 today will transfuse as needed. 

Per GI note may be related to polypectomies.  


HTN;  elevated yesterday.  amlodipine added and prn clonidine


s/p fall:  fall reported per nursing.  Neuro checks every 4 hours.  Will order 

PT and OT.  Right shoulder and back discomfort reported that was relieved with 

tylenol





12/08/17 


Hematochezia:  Sandostatin drip stopped and diet advanced.  HGB decreased to 

8.3 from 9.6 yesterday.  Blood pressure is stable.  Recheck in AM.  Prevacid 

and simethicone started. 


S/P Fall:  complaining of back discomfort in mid back region will obtain rib 

xray.  Norco added for pain .  Will need to monitor carefully as patient is 

sensitive to pain medication.  


PT and OT started. 





12/12/17


Hematochezia:  None today.  Sandostatin drip stopped.  On full liquid diet.  

Denies any nausea or vomiting. A colonic ulcer was noted in the cecum-prob 

related to polypectomies. Other source of bleeding may be diverticulosis per GI 

note


Abdominal discomfort:  Non tender on palpation this morning.  Elevated LFT and 

lipase.  Pending today.  Abnormal CT scan--dilated GB with thickened wall and 

dilated CBD.  Surgery consulted per note gallbladder may be distended from 

being n.p.o. on and off for the last two weeks and being ill. MRCP-  Probable 

small 3 mm stone in distal common duct. Common bile duct is dilated to 13 mm. 

There is also gallbladder wall thickening and some mild pericholecystic fluid.





12/13/17


Abdominal discomfort:  ERCP planned for today.  Patient is NPO.  IVF's ordered. 


Anemia;  HGB stable will recheck in AM


Hypokalemia: k yesterday 3.1.  replacement given recheck in AM





12/14/17


Abdominal discomfort:  None reported this morning.  ERCP yesterday.  Will 

resume full liquid diet.  IVF discontinued.  Reported increased pain after ERCP 

yesterday but has resolved since then.


Anemia:  Remains stable will add iron replacement. 


Hypokalemia:  Results pending this morning


 








I and the ARNP have both examined this patient and reviewed this note and I 

agree with these findings and plan of care.  Jose A Mendenhall DO/











Gellermann,Diane M. [facility] Dec 14, 2017 08:22

## 2017-12-14 NOTE — HHI.GIFU
GI Follow-up Note


Consult Follow-up





Subjective:  Patient c/o mid back pain all last night for which he states he 

received oral pain meds. When he awoke this am


    the pain was gone. No nausea. Had a large nonbloody BM last PM. H&H stable.


Objective:


PHYSICAL EXAMINATION:


Vitals signs stable


No fever





CHEST:  Chest reveals mild decrease BS bilat with a few inspriratory crackles 

at bases


CARDIAC:  Regular rate and rhythm with no murmur gallop or rubs.


ABDOMEN:  Soft, nondistended, nontender


EXTREMITIES: No clubbing, cyanosis, or edema.


SKIN:  Normal; no rash; no jaundice.


CNS:  No focal deficits; alert and oriented times three.


Available Data (labs, X- Rays, Procedues) : 














ASSESSMENT/PLAN:


1. Choledocholithiasis-s/p sphincterotomy and balloon clearance. ?mild 

pancreatitis. Will see how he does with full liquids. If no further pain


     can advance diet to low fat and discharge.


2. Post-polypectomy bleed-stable.





It was a pleasure seeing Jamshid Simon. Thank you for this consult.


Entered by: Bijan Yoo MD Dec 14, 2017 09:06

## 2017-12-14 NOTE — HHI.PR
cc:   Alfredo Powell MD


__________________________________________________





Subjective


Subjective Notes





--------------------------------------------------------------------------------

------------------------------------------------------


DAILY PROGRESS NOTE FOR SURGICAL ATTENDING, DR. ALFREDO POWELL


--------------------------------------------------------------------------------

--------------------------------------------------------


Patient feeling better after ERCP had an episode of some abdominal discomfort 

and back pain


Wanted to discuss timing of outpatient laparoscopic cholecystectomy





Objective


Vitals/I&O





Vital Signs








  Date Time  Temp Pulse Resp B/P (MAP) Pulse Ox O2 Delivery O2 Flow Rate FiO2


 


12/14/17 17:43     95   21


 


12/14/17 16:00 99.0 77 17 129/60 (83)    


 


12/13/17 19:01      Room Air  


 


12/12/17 20:17       2.00 








Labs





Laboratory Tests








Test


  12/14/17


06:50


 


White Blood Count 10.5 


 


Red Blood Count 3.36 


 


Hemoglobin 10.1 


 


Hematocrit 30.4 


 


Mean Corpuscular Volume 90.4 


 


Mean Corpuscular Hemoglobin 30.2 


 


Mean Corpuscular Hemoglobin


Concent 33.4 


 


 


Red Cell Distribution Width 13.7 


 


Platelet Count 248 


 


Mean Platelet Volume 8.7 


 


Blood Urea Nitrogen 5 


 


Creatinine 0.48 


 


Random Glucose 125 


 


Calcium Level 8.1 


 


Sodium Level 141 


 


Potassium Level 3.3 


 


Chloride Level 106 


 


Carbon Dioxide Level 27.6 


 


Anion Gap 7 


 


Estimat Glomerular Filtration


Rate 169 


 








Radiology





Last Impressions








GI Procedure 12/13/17 0000 Signed





Impressions: 





 Service Date/Time:  Wednesday, December 13, 2017 17:47 - CONCLUSION: ERCP as 





 above.     Alfredo Scott MD 


 


Cholangiopancreatography MRI 12/11/17 0000 Signed





Impressions: 





 Service Date/Time:  Monday, December 11, 2017 17:25 - CONCLUSION:  1. Probable 





 small 3 mm stone in distal common duct. Common bile duct is dilated to 13 mm. 





 There is also gallbladder wall thickening and some mild pericholecystic fluid. 





 2. Mild anasarca. 3. Small bilateral pleural effusions and basilar lung 





 consolidation.     Alfredo Scott MD 


 


Abdomen/Pelvis CT 12/10/17 0000 Signed





Impressions: 





 Service Date/Time:  Maulik, December 10, 2017 21:53 - CONCLUSION:  1. 

Distended 





 gallbladder with thickening of the gallbladder wall. Skin be correlated with 

any 





 clinical signs of cholecystitis. The common bile duct is dilated at 1.2 cm. 





 Calcified gallstones are not seen. 2. Mild bilateral pleural effusions. There 

is 





 some consolidation or atelectasis at the right lung base. 3. Scattered colonic 





 diverticula.     Rickey Islas MD 


 


Ribs X-Ray 12/8/17 0000 Signed





Impressions: 





 Service Date/Time:  Friday, December 8, 2017 09:57 - CONCLUSION:  1. There is 

no 





 evidence of acute fracture.       Gigi Eastman MD 


 


GI Bleed Scan Nuclear Medicine 12/6/17 0000 Signed





Impressions: 





 Service Date/Time:  Wednesday, December 6, 2017 14:32 - CONCLUSION:  1. 

Negative 





 gastrointestinal bleeding scan     Gigi Eastman MD 








Cardiovascular:  Regular


Abdomen:  Other (mild soreness midepigastric)





A/P


Problem List:  


(1) S/P ERCP


ICD Codes:  Z98.890 - Other specified postprocedural states


(2) Choledocholithiasis


ICD Codes:  K80.50 - Calculus of bile duct without cholangitis or cholecystitis 

without obstruction


Status:  Acute


(3) GI bleed


ICD Codes:  K92.2 - Gastrointestinal hemorrhage, unspecified


Status:  Chronic


(4) Abdominal pain


ICD Codes:  R10.9 - Unspecified abdominal pain


Status:  Chronic


Assessment and Plan


77 year old male s/p numerous polypectomies that resulted in a LGIB (now 

resolved); CT abd/pelvis shows gallbladder with a dilated common bile duct


 if MRCP shows distal common duct stones





 ERCP and sphincterotomy was done retrieving a few stones


discussed timing of cholecystectomy in the next week or 2 after recovery at home





 I told the patient he may benefit from just recovering from all the 

interventions that he has had get discharged from the hospital and follow up in 

my office for elective cholecystectomy 


The patient and his wife agree





Attending Statement











--------------------------------------------------------------------------------

------------------------------------------------------


 NOTE FOR SURGICAL ATTENDING, DR. ALFREDO POWELL


--------------------------------------------------------------------------------

--------------------------------------------------------





I attest that I had a face-to-face encounter with the patient on the same day, 

and personally performed and documented my assessment and findings in the 

medical record.





  





The following services were provided during this hospital visit:





   Chart data review, vital sign assessments/reviewing monitor data


   Review of consultations notes if present.


   Medication orders/review and/or management


   Ordering and/or reviewing lab tests


   Ordering and/or interpreting/reviewing x-rays and/or diagnostic studies 


   Care of the patient and discussion of the patient with the care team 


   Documentation time


   To help prompt me to consider important information that might be impacting 

today's encounter and assessment,


   information from prior notes written by myself or my colleagues may have 

been "brought forward/copy and pasted" into today's note.











Alfredo Powell MD Dec 14, 2017 20:00

## 2017-12-15 VITALS
TEMPERATURE: 96.1 F | RESPIRATION RATE: 18 BRPM | SYSTOLIC BLOOD PRESSURE: 169 MMHG | HEART RATE: 73 BPM | DIASTOLIC BLOOD PRESSURE: 85 MMHG | OXYGEN SATURATION: 96 %

## 2017-12-15 VITALS
OXYGEN SATURATION: 97 % | RESPIRATION RATE: 17 BRPM | HEART RATE: 71 BPM | TEMPERATURE: 97.6 F | SYSTOLIC BLOOD PRESSURE: 149 MMHG | DIASTOLIC BLOOD PRESSURE: 78 MMHG

## 2017-12-15 VITALS
TEMPERATURE: 98 F | HEART RATE: 74 BPM | DIASTOLIC BLOOD PRESSURE: 71 MMHG | RESPIRATION RATE: 16 BRPM | OXYGEN SATURATION: 97 % | SYSTOLIC BLOOD PRESSURE: 144 MMHG

## 2017-12-15 LAB
ANION GAP SERPL CALC-SCNC: 6 MEQ/L (ref 5–15)
BASOPHILS # BLD AUTO: 0 TH/MM3 (ref 0–0.2)
BASOPHILS NFR BLD: 0.5 % (ref 0–2)
BUN SERPL-MCNC: 5 MG/DL (ref 7–18)
CHLORIDE SERPL-SCNC: 107 MEQ/L (ref 98–107)
EOSINOPHIL # BLD: 0.2 TH/MM3 (ref 0–0.4)
EOSINOPHIL NFR BLD: 2.7 % (ref 0–4)
ERYTHROCYTE [DISTWIDTH] IN BLOOD BY AUTOMATED COUNT: 14.1 % (ref 11.6–17.2)
GFR SERPLBLD BASED ON 1.73 SQ M-ARVRAT: 124 ML/MIN (ref 89–?)
HCO3 BLD-SCNC: 28.1 MEQ/L (ref 21–32)
HCT VFR BLD CALC: 28.4 % (ref 39–51)
HEMO FLAGS: (no result)
LYMPHOCYTES # BLD AUTO: 1.1 TH/MM3 (ref 1–4.8)
LYMPHOCYTES NFR BLD AUTO: 13.9 % (ref 9–44)
MCH RBC QN AUTO: 30.9 PG (ref 27–34)
MCHC RBC AUTO-ENTMCNC: 34.2 % (ref 32–36)
MCV RBC AUTO: 90.4 FL (ref 80–100)
MONOCYTES NFR BLD: 10.2 % (ref 0–8)
NEUTROPHILS # BLD AUTO: 5.6 TH/MM3 (ref 1.8–7.7)
NEUTROPHILS NFR BLD AUTO: 72.7 % (ref 16–70)
PLATELET # BLD: 277 TH/MM3 (ref 150–450)
POTASSIUM SERPL-SCNC: 3.8 MEQ/L (ref 3.5–5.1)
RBC # BLD AUTO: 3.14 MIL/MM3 (ref 4.5–5.9)
SODIUM SERPL-SCNC: 141 MEQ/L (ref 136–145)
WBC # BLD AUTO: 7.7 TH/MM3 (ref 4–11)

## 2017-12-15 RX ADMIN — CARVEDILOL SCH MG: 6.25 TABLET, FILM COATED ORAL at 08:47

## 2017-12-15 RX ADMIN — POTASSIUM CHLORIDE SCH MEQ: 20 TABLET, EXTENDED RELEASE ORAL at 08:47

## 2017-12-15 RX ADMIN — Medication SCH MG: at 08:47

## 2017-12-15 RX ADMIN — ACETAMINOPHEN PRN MG: 325 TABLET ORAL at 00:19

## 2017-12-15 RX ADMIN — SUCRALFATE SCH GM: 1 TABLET ORAL at 08:00

## 2017-12-15 RX ADMIN — Medication SCH ML: at 08:48

## 2017-12-15 NOTE — HHI.GIFU
GI Follow-up Note


Consult Follow-up





Subjective:  Patient tolerating diet. Has some mild diffuse abdominal "soreness

" with coughing otherwise no abd or back pain.


Objective:


PHYSICAL EXAMINATION:


Vitals signs stable


No fever





ABDOMEN:  Soft, nondistended, nontender


EXTREMITIES: No clubbing, cyanosis, or edema.


SKIN:  Normal; no rash; no jaundice.


CNS:  No focal deficits; alert and oriented times three.


Available Data (labs, X- Rays, Procedues) : 





H&H stable








ASSESSMENT/PLAN:


1. Choledocholithiasis- stable for discharge. I advised him to follow a low fat 

diet. He knows to f/u with general


      surgery for eventual cholecystectomy.


2. Post-polypectomy bleed-stable. Will arrange for outpt EMR remaining large 

ascending polyp after the Holidays.


      He agrees to be tested for attenuated FAP. OK to discharge home. Will 

sign off.





It was a pleasure seeing Jamshid Simon. Thank you for this consult.


Entered by: Bijan Yoo MD Dec 15, 2017 09:59

## 2017-12-15 NOTE — HHI.DS
Discharge Summary


Admission Date


Dec 6, 2017 at 00:06


Discharge Date:  Dec 15, 2017


Admitting Diagnosis





lower GI bleed





(1) GI bleed


ICD Codes:  K92.2 - Gastrointestinal hemorrhage, unspecified


Status:  Chronic


(2) Abdominal pain


ICD Codes:  R10.9 - Unspecified abdominal pain


Status:  Chronic


(3) Choledocholithiasis


ICD Codes:  K80.50 - Calculus of bile duct without cholangitis or cholecystitis 

without obstruction


Status:  Acute


(4) Hematochezia


ICD Codes:  K92.1 - Melena


Status:  Acute


Procedures





Current Medications








 Medications


  (Trade)  Dose


 Ordered  Sig/Lawrence


 Route  Start Time


 Stop Time Status Last Admin


 


  (NS Flush)  2 ml  UNSCH  PRN


 IV FLUSH  12/6/17 00:15


    12/11/17 12:01


 


 


  (NS Flush)  2 ml  BID


 IV FLUSH  12/6/17 09:00


    12/11/17 09:52


 


 


  (Narcan Inj)  0.4 mg  UNSCH  PRN


 IV PUSH  12/6/17 00:15


     


 


 


  (Coreg)  6.25 mg  TID


 PO  12/6/17 09:00


    12/11/17 09:51


 


 


  (Zetia)  10 mg  HS


 PO  12/6/17 21:00


    12/10/17 23:04


 


 


  (Pravachol)  40 mg  HS


 PO  12/6/17 21:00


    12/10/17 23:04


 


 


  (Catapres)  0.1 mg  Q6H  PRN


 PO  12/6/17 11:45


    12/10/17 23:15


 


 


  (Tylenol)  650 mg  Q4H  PRN


 PO  12/6/17 13:15


    12/9/17 23:12


 


 


  (Prevacid Odt)  30 mg  HS@2000


 PO  12/7/17 20:00


    12/10/17 23:04


 


 


  (Levsin)  0.125 mg  Q6H  PRN


 SL  12/7/17 19:45


     


 


 


  (Mylicon Chew)  80 mg  Q4H  PRN


 PO  12/7/17 20:15


    12/8/17 18:05


 


 


  (Norco  5-325 Mg)  1 tab  Q4H  PRN


 PO  12/8/17 07:00


    12/11/17 09:51


 


 


  (Carafate)  1 gm  ACHS


 PO  12/9/17 17:00


    12/11/17 12:00


 








Brief History


77-year-old male came to the emergency room with history of hematochezia that 

started at 6:45 PM.  Patient says that he had 10 polyps taken out during a 

colonoscopy about one week ago by Dr. Taalvera.  He started having some abdominal 

discomfort 2 days later and was started on ciprofloxacin.  However when the 

bleeding started today he called the GI office but did not hear back from 

anybody.  In next one hour he had 3 more such episodes.  Patient describes 

these bleeding as big clots being passed.  History of point tenderness.  Just 

generalized discomfort in the abdomen but no point tenderness.


CBC/BMP:  


12/15/17 0654                                                                  

              12/15/17 0654





Significant Findings





Laboratory Tests








Test


  12/12/17


12:30 12/13/17


10:37 12/14/17


06:50 12/15/17


06:54


 


Blood Urea Nitrogen  6 MG/DL (7-18)  5 MG/DL (7-18)  5 MG/DL (7-18) 


 


Creatinine


  


  0.49 MG/DL


(0.60-1.30) 0.48 MG/DL


(0.60-1.30) 


 


 


Calcium Level


  


  7.9 MG/DL


(8.5-10.1) 8.1 MG/DL


(8.5-10.1) 8.3 MG/DL


(8.5-10.1)


 


Potassium Level


  


  3.3 MEQ/L


(3.5-5.1) 3.3 MEQ/L


(3.5-5.1) 


 


 


Red Blood Count


  


  


  3.36 MIL/MM3


(4.50-5.90) 3.14 MIL/MM3


(4.50-5.90)


 


Hemoglobin


  


  


  10.1 GM/DL


(13.0-17.0) 9.7 GM/DL


(13.0-17.0)


 


Hematocrit


  


  


  30.4 %


(39.0-51.0) 28.4 %


(39.0-51.0)


 


Random Glucose


  


  


  125 MG/DL


() 


 


 


Neutrophils (%) (Auto)


  


  


  


  72.7 %


(16.0-70.0)


 


Monocytes (%) (Auto)


  


  


  


  10.2 %


(0.0-8.0)








PE at Discharge


GENERAL: alert and cooperative


SKIN: Warm and dry.


HEAD: Normocephalic.


EYES: No scleral icterus. No injection or drainage. 


NECK: Supple, trachea midline. No JVD or lymphadenopathy.


CARDIOVASCULAR: Regular rate and rhythm without murmurs, gallops, or rubs. 


RESPIRATORY: Breath sounds equal bilaterally. No accessory muscle use.


GASTROINTESTINAL: Abdomen soft, non-tender, nondistended. 


MUSCULOSKELETAL: No cyanosis, or edema. Tenderness palpated in mid back region


BACK: Nontender without obvious deformity. No CVA tenderness.





Hospital Course


77-year-old male came to the emergency room with history of hematochezia.  

Patient  had 10 polyps taken out during a colonoscopy about one week ago by Dr. Talavera prior to admission  He started having some abdominal discomfort  and was 

started on ciprofloxacin.  Patient describes these bleeding as big clots being 

passed.  History of point tenderness.  Just generalized discomfort in the 

abdomen but no point tenderness.  GI and surgery consulted during his stay.   

Patient with post-polypectomy bleed which is stable now.  Also diagnosed with 

choledocholithiasis low fat diet recommended.  During his stay he has a ERCP 

and sphincterotomy was done retrieving stones.  Elective cholecystectomy in the 

future will follow up with surgery.  Discharged home with home health care.


Pt Condition on Discharge:  Good


Discharge Disposition:  Disch w/ Home Health Serv


Discharge Instructions


DIET: Follow Instructions for:  Low Fat Diet


Activities you can perform:  Regular-No Restrictions


Follow up Referrals:  


Appointment for Follow Up @ surgery


PCP Follow-up @ [facility]





New Medications:  


Walker Auto Glides/8 Adju (Walker Auto Glides/8 Adju) 1 Mis Mis


EA .ROUTE AS DIRECTED, #1





Hydrocodone/Acetaminophen (Hydrocodone-Acetamin 5-325 mg) 5 Mg-325 Mg Tablet


1 TAB PO Q4H PRN for pain 1-10 for 7 Days, #42 TAB





Polysaccharide Iron Complex (Poly-Iron 150) 150 Mg Iron Cap


150 MG PO Q12HR for Electrolyte Replacement for 30 Days, #60 CAP





Potassium Chloride Microencaps (Potassium Chloride Microencaps) 20 Meq Tab


20 MEQ PO Q12HR for Electrolyte Replacement for 30 Days, TAB





Sucralfate (Carafate) 1 Gram Tab


1 GM PO ACHS for Heartburn Management for 30 Days, TAB





[Simethicone Chew] () 80 MG CHEW


80 MG PO Q4H PRN for GAS PAIN





 


Continued Medications:  


Carvedilol (Carvedilol) 6.25 Mg Tab


6.25 MG PO TID for Blood Pressure Management, #60 TAB 0 Refills (This 

prescription has been renewed)





Ezetimibe-Simvastatin (Vytorin) 10-20 Mg Tab


1 TAB PO HS for Blood Pressure Management, #30 TAB 0 Refills (This prescription 

has been renewed)





Lansoprazole (Prevacid) 30 Mg Capdr


30 MG PO DAILY for Heartburn Management for 30 Days, #30 CAP 0 Refills (This 

prescription has been renewed)

















Gellermann,Diane M. ARNP Dec 15, 2017 11:18

## 2018-03-23 ENCOUNTER — HOSPITAL ENCOUNTER (EMERGENCY)
Dept: HOSPITAL 17 - PHED | Age: 78
LOS: 1 days | Discharge: HOME | End: 2018-03-24
Payer: MEDICARE

## 2018-03-23 VITALS
DIASTOLIC BLOOD PRESSURE: 79 MMHG | SYSTOLIC BLOOD PRESSURE: 151 MMHG | RESPIRATION RATE: 16 BRPM | OXYGEN SATURATION: 97 % | HEART RATE: 82 BPM

## 2018-03-23 VITALS — HEIGHT: 72 IN | BODY MASS INDEX: 23.71 KG/M2 | WEIGHT: 175.05 LBS

## 2018-03-23 VITALS
DIASTOLIC BLOOD PRESSURE: 96 MMHG | SYSTOLIC BLOOD PRESSURE: 212 MMHG | OXYGEN SATURATION: 96 % | TEMPERATURE: 98.2 F | RESPIRATION RATE: 18 BRPM | HEART RATE: 67 BPM

## 2018-03-23 DIAGNOSIS — I25.10: ICD-10-CM

## 2018-03-23 DIAGNOSIS — I25.2: ICD-10-CM

## 2018-03-23 DIAGNOSIS — E78.5: ICD-10-CM

## 2018-03-23 DIAGNOSIS — R30.0: ICD-10-CM

## 2018-03-23 DIAGNOSIS — R33.9: Primary | ICD-10-CM

## 2018-03-23 DIAGNOSIS — I10: ICD-10-CM

## 2018-03-23 DIAGNOSIS — Z98.890: ICD-10-CM

## 2018-03-23 DIAGNOSIS — Z95.1: ICD-10-CM

## 2018-03-23 LAB
BACTERIA #/AREA URNS HPF: (no result) /HPF
BUN SERPL-MCNC: 7 MG/DL (ref 7–18)
CALCIUM SERPL-MCNC: 8.6 MG/DL (ref 8.5–10.1)
CHLORIDE SERPL-SCNC: 101 MEQ/L (ref 98–107)
COLOR UR: YELLOW
CREAT SERPL-MCNC: 0.86 MG/DL (ref 0.6–1.3)
GFR SERPLBLD BASED ON 1.73 SQ M-ARVRAT: 86 ML/MIN (ref 89–?)
GLUCOSE SERPL-MCNC: 132 MG/DL (ref 74–106)
GLUCOSE UR STRIP-MCNC: (no result) MG/DL
HCO3 BLD-SCNC: 28.4 MEQ/L (ref 21–32)
HGB UR QL STRIP: (no result)
KETONES UR STRIP-MCNC: 15 MG/DL
LEUKOCYTE ESTERASE UR QL STRIP: (no result) /HPF (ref 0–5)
NITRITE UR QL STRIP: (no result)
RBC #/AREA URNS HPF: (no result) /HPF (ref 0–3)
SODIUM SERPL-SCNC: 137 MEQ/L (ref 136–145)
SP GR UR STRIP: 1.02 (ref 1–1.03)
SQUAMOUS #/AREA URNS HPF: (no result) /HPF (ref 0–5)
URINE LEUKOCYTE ESTERASE: (no result)

## 2018-03-23 PROCEDURE — 80048 BASIC METABOLIC PNL TOTAL CA: CPT

## 2018-03-23 PROCEDURE — P9612 CATHETERIZE FOR URINE SPEC: HCPCS

## 2018-03-23 PROCEDURE — 99284 EMERGENCY DEPT VISIT MOD MDM: CPT

## 2018-03-23 PROCEDURE — 81001 URINALYSIS AUTO W/SCOPE: CPT

## 2018-03-23 PROCEDURE — 96360 HYDRATION IV INFUSION INIT: CPT

## 2018-03-23 NOTE — PD
HPI


Chief Complaint:   Complaint


Time Seen by Provider:  21:00


Travel History


International Travel<30 days:  No


Contact w/Intl Traveler<30days:  No


Traveled to known affect area:  No





History of Present Illness


HPI


77-year-old male presents to the emergency department by private transportation 

for complaint of urinary retention.  According to the patient earlier today at 

[facility] he underwent elective laparoscopic cholecystectomy.  Patient 

states while he was in recovery he was having difficulty with urination and an 

in and out catheter was performed to enter his bladder.  Patient was encouraged 

to go home increase fluid hydration and return to the [facility] if he 

had recurrent urinary retention.  Patient states he has not urinated since 2:30 

PM.  Patient states because he did not want to make a long distance travel from 

his home to [facility] where he had his procedure performed he decided to 

come here for his concern of urinary retention.  Patient also complains of 

burning with urination since having the catheterization this afternoon.  

Patient denies fever chills nausea vomiting shortness of breath chest pain 

pleuritic chest pain generalized weakness and states she has had minimal 

flatus.  Patient has been drinking a small amount of fluids this afternoon a 

couple coffee and some water.  Patient has had previous TURP and is followed by 

Dr. Odell is his urologist.  Patient states he tried to reach his 

urologist but the office was closed.  Patient rates his discomfort 3/10 

intensity.  Patient is not prescribed any blood thinning agents.  Patient is 

not taking an antibiotic.





PFSH


Past Medical History


*** Narrative Medical


Arthritis cardiac catheterization and CABG dyslipidemia hypertension CAD 

cholecystectomy TURP; no tobacco use no alcohol use; nursing notes


Arthritis:  Yes


Heart Rhythm Problems:  No


Cancer:  Yes (LEFT EAR CA, WAS REMOVED)


Cardiac Catheterization:  Yes


Cardiovascular Problems:  Yes (BYPASS X 5)


High Cholesterol:  Yes


Chemotherapy:  No


Chest Pain:  Yes


Congestive Heart Failure:  No


Coronary Artery Disease:  Yes


Endocrine:  No


GERD:  No


Genitourinary:  No


Hiatal Hernia:  No


Hypertension:  Yes


Immune Disorder:  No


Neurologic:  No


Psychiatric:  No


Reproductive:  No


Respiratory:  No


Immunizations Current:  Yes


Myocardial Infarction:  Yes (x 2)


Radiation Therapy:  No


Ulcer:  No





Past Surgical History


Abdominal Surgery:  No


Cardiac Surgery:  No


Coronary Artery Bypass Graft:  Yes


Ear Surgery:  No


Endocrine Surgery:  No


Eye Surgery:  No


Genitourinary Surgery:  Yes (turp)


Gynecologic Surgery:  No


Oral Surgery:  No


Thoracic Surgery:  No


Other Surgery:  Yes (pacemaker)





Social History


Alcohol Use:  No


Tobacco Use:  No


Substance Use:  No





Allergies-Medications


(Allergen,Severity, Reaction):  


Coded Allergies:  


     hydromorphone (Unverified  Allergy, Severe, HALLUCINATIONS; AGITATION, 3/23

/18)


     latex (Unverified  Adverse Reaction, Severe, SKIN IRRITATION, 3/23/18)


     metoprolol (Unverified  Adverse Reaction, Severe, FORGETS, BUT AVOIDS, 3/23

/18)


Reported Meds & Prescriptions





Reported Meds & Active Scripts


Active


Carafate (Sucralfate) 1 Gram Tab 1 Gm PO ACHS 30 Days


[Simethicone Chew] 80 MG Chew 80 Mg PO Q4H PRN


Potassium Chloride Microencaps 20 Meq Tab 20 Meq PO Q12HR 30 Days


Hydrocodone-Acetamin 5-325 mg (Hydrocodone/Acetaminophen) 5 Mg-325 Mg Tablet 1 

Tab PO Q4H PRN 7 Days


Poly-Iron 150 (Polysaccharide Iron Complex) 150 Mg Iron Cap 150 Mg PO Q12HR 30 

Days


Prevacid (Lansoprazole) 30 Mg Capdr 30 Mg PO DAILY 30 Days


Vytorin (Ezetimibe-Simvastatin) 10-20 Mg Tab 1 Tab PO HS


Reported


Celebrex (Celecoxib) 200 Mg Cap 200 Mg PO DAILY


Losartan (Losartan Potassium) 100 Mg Tab 100 Mg PO DAILY








Review of Systems


Except as stated in HPI:  all other systems reviewed are Neg


General / Constitutional:  No: Fever, Chills


HENT:  No: Congestion


Cardiovascular:  No: Chest Pain or Discomfort


Respiratory:  No: Shortness of Breath, Pleuritic Pain


Gastrointestinal:  No: Nausea, Vomiting, Abdominal Pain


Genitourinary:  Positive: Dysuria, Decreased Urinary Output, Hesitancy


Musculoskeletal:  No: Myalgias


Neurologic:  No: Weakness


Psychiatric:  No: Anxiety


Hematologic/Lymphatic:  No: Lymph Node Enlargement





Physical Exam


Narrative


GENERAL: Well-developed well-nourished male no acute distress no respiratory 

distress


SKIN: Warm and dry.


HEAD: Normocephalic.


EYES: No scleral icterus. No injection or drainage. 


NECK: Supple, trachea midline. No JVD or lymphadenopathy.


CARDIOVASCULAR: Regular rate and rhythm without murmurs, gallops, or rubs. 


RESPIRATORY: Breath sounds equal bilaterally. No accessory muscle use.


GASTROINTESTINAL: Abdomen soft, non-tender, nondistended.  Laparoscopy sites 

dressings are dry no oozing serous or serosanguineous or seropurulent drainage 

no induration nontender.  No suprapubic tenderness or distention.


MUSCULOSKELETAL: No cyanosis, or edema. 


BACK: Nontender without obvious deformity. No CVA tenderness.








Data


Data


Last Documented VS





Vital Signs








  Date Time  Temp Pulse Resp B/P (MAP) Pulse Ox O2 Delivery O2 Flow Rate FiO2


 


3/23/18 19:06 98.2 67 18 212/96 (134) 96   








Orders





 Orders


Basic Metabolic Panel (Bmp) (3/23/18 21:08)


Urinalysis - C+S If Indicated (3/23/18 21:08)


Urinary Catheter Insert/Apply (3/23/18 21:08)


Lidocaine 2% Jelly (Xylocaine 2% Jelly) (3/23/18 21:15)


Sodium Chlorid 0.9% 500 Ml Inj (Ns 500 M (3/23/18 21:15)


Sodium Chlorid 0.9% 500 Ml Inj (Ns 500 M (3/23/18 23:45)


Ed Discharge Order (3/23/18 23:42)





Labs





Laboratory Tests








Test


  3/23/18


21:49 3/23/18


22:04


 


Urine Color YELLOW  


 


Urine Turbidity CLEAR  


 


Urine pH 6.0  


 


Urine Specific Gravity 1.020  


 


Urine Protein NEG mg/dL  


 


Urine Glucose (UA) NEG mg/dL  


 


Urine Ketones 15 mg/dL  


 


Urine Occult Blood NEG  


 


Urine Nitrite NEG  


 


Urine Bilirubin NEG  


 


Urine Urobilinogen 0.2 MG/DL  


 


Urine Leukocyte Esterase NEG  


 


Urine RBC 0-2 /hpf  


 


Urine WBC 0-2 /hpf  


 


Urine Squamous Epithelial


Cells 0-5 /hpf 


  


 


 


Urine Bacteria NONE /hpf  


 


Microscopic Urinalysis Comment


  CULT NOT


INDICATED 


 


 


Blood Urea Nitrogen  7 MG/DL 


 


Creatinine  0.86 MG/DL 


 


Random Glucose  132 MG/DL 


 


Calcium Level  8.6 MG/DL 


 


Sodium Level  137 MEQ/L 


 


Potassium Level  4.7 MEQ/L 


 


Chloride Level  101 MEQ/L 


 


Carbon Dioxide Level  28.4 MEQ/L 


 


Anion Gap  8 MEQ/L 


 


Estimat Glomerular Filtration


Rate 


  86 ML/MIN 


 











MDM


Medical Decision Making


Medical Screen Exam Complete:  Yes


Emergency Medical Condition:  Yes


Medical Record Reviewed:  Yes


Interpretation(s)


CBC & BMP Diagram


3/23/18 22:04








Calcium Level 8.6








Vital Signs








  Date Time  Temp Pulse Resp B/P (MAP) Pulse Ox O2 Delivery O2 Flow Rate FiO2


 


3/23/18 19:06 98.2 67 18 212/96 (134) 96   





UA: Scant ketones otherwise normal; culture not indicated


Differential Diagnosis


Urinary retention, anesthesia side effects, renal insufficiency, UTI


Narrative Course


Bladder scan ordered approx 500 ml


Urinary catheter inserted; lab values found to be in normal range


Patient received additional 500 cc bolus of normal saline and taking oral 

hydration well urinary catheter removed





Diagnosis





 Primary Impression:  


 Urinary retention


Referrals:  


Doug Arnold MD


3 days


Patient Instructions:  General Instructions





***Additional Instructions:  


Increase fluid hydration


Return to the emergency department for any concerns or change in condition


Take acetaminophen/Tylenol as needed for fever 100.4F or greater


Disposition:  01 DISCHARGE HOME


Condition:  Stable











Dafne Reddy MD Mar 23, 2018 21:05

## 2018-06-15 ENCOUNTER — APPOINTMENT (RX ONLY)
Dept: URBAN - METROPOLITAN AREA CLINIC 52 | Facility: CLINIC | Age: 78
Setting detail: DERMATOLOGY
End: 2018-06-15

## 2018-06-15 DIAGNOSIS — L82.1 OTHER SEBORRHEIC KERATOSIS: ICD-10-CM

## 2018-06-15 DIAGNOSIS — L57.0 ACTINIC KERATOSIS: ICD-10-CM

## 2018-06-15 DIAGNOSIS — L81.4 OTHER MELANIN HYPERPIGMENTATION: ICD-10-CM

## 2018-06-15 DIAGNOSIS — L57.8 OTHER SKIN CHANGES DUE TO CHRONIC EXPOSURE TO NONIONIZING RADIATION: ICD-10-CM

## 2018-06-15 PROBLEM — Z85.828 PERSONAL HISTORY OF OTHER MALIGNANT NEOPLASM OF SKIN: Status: ACTIVE | Noted: 2018-06-15

## 2018-06-15 PROCEDURE — ? COUNSELING

## 2018-06-15 PROCEDURE — ? LIQUID NITROGEN

## 2018-06-15 PROCEDURE — 99213 OFFICE O/P EST LOW 20 MIN: CPT | Mod: 25

## 2018-06-15 PROCEDURE — 17000 DESTRUCT PREMALG LESION: CPT

## 2018-06-15 PROCEDURE — 17003 DESTRUCT PREMALG LES 2-14: CPT

## 2018-06-15 ASSESSMENT — LOCATION DETAILED DESCRIPTION DERM
LOCATION DETAILED: LEFT INFERIOR LATERAL MALAR CHEEK
LOCATION DETAILED: RIGHT MEDIAL FRONTAL SCALP
LOCATION DETAILED: RIGHT SUPERIOR PARIETAL SCALP
LOCATION DETAILED: MID-FRONTAL SCALP
LOCATION DETAILED: POSTERIOR MID-PARIETAL SCALP
LOCATION DETAILED: MID-OCCIPITAL SCALP

## 2018-06-15 ASSESSMENT — LOCATION ZONE DERM
LOCATION ZONE: FACE
LOCATION ZONE: SCALP

## 2018-06-15 ASSESSMENT — LOCATION SIMPLE DESCRIPTION DERM
LOCATION SIMPLE: POSTERIOR SCALP
LOCATION SIMPLE: RIGHT SCALP
LOCATION SIMPLE: SCALP
LOCATION SIMPLE: ANTERIOR SCALP
LOCATION SIMPLE: LEFT CHEEK

## 2018-06-15 NOTE — HPI: EVALUATION OF SKIN LESION(S)
Hpi Title: Evaluation of Skin Lesions
How Severe Are Your Spot(S)?: mild
Have Your Spot(S) Been Treated In The Past?: has been treated
When Was It Treated?: 11/09/2017

## 2018-06-15 NOTE — PROCEDURE: LIQUID NITROGEN
Consent: The patient's consent was obtained including but not limited to risks of crusting, scabbing, blistering, scarring, darker or lighter pigmentary change, recurrence, incomplete removal and infection.
Render Post-Care Instructions In Note?: no
Duration Of Freeze Thaw-Cycle (Seconds): 3
Number Of Freeze-Thaw Cycles: 3 freeze-thaw cycles
Detail Level: Detailed
Post-Care Instructions: I reviewed with the patient in detail post-care instructions. Patient is to wear sunprotection, and avoid picking at any of the treated lesions. Pt may apply Vaseline to crusted or scabbing areas.

## 2019-07-11 ENCOUNTER — APPOINTMENT (RX ONLY)
Dept: URBAN - METROPOLITAN AREA CLINIC 52 | Facility: CLINIC | Age: 79
Setting detail: DERMATOLOGY
End: 2019-07-11

## 2019-07-11 DIAGNOSIS — L81.0 POSTINFLAMMATORY HYPERPIGMENTATION: ICD-10-CM

## 2019-07-11 DIAGNOSIS — L57.0 ACTINIC KERATOSIS: ICD-10-CM

## 2019-07-11 DIAGNOSIS — L57.8 OTHER SKIN CHANGES DUE TO CHRONIC EXPOSURE TO NONIONIZING RADIATION: ICD-10-CM

## 2019-07-11 PROCEDURE — 17000 DESTRUCT PREMALG LESION: CPT

## 2019-07-11 PROCEDURE — ? LIQUID NITROGEN

## 2019-07-11 PROCEDURE — ? COUNSELING

## 2019-07-11 PROCEDURE — 99213 OFFICE O/P EST LOW 20 MIN: CPT | Mod: 25

## 2019-07-11 PROCEDURE — 17003 DESTRUCT PREMALG LES 2-14: CPT

## 2019-07-11 PROCEDURE — ? SUNSCREEN RECOMMENDATIONS

## 2019-07-11 ASSESSMENT — LOCATION DETAILED DESCRIPTION DERM
LOCATION DETAILED: LEFT INFERIOR TEMPLE
LOCATION DETAILED: LEFT CENTRAL MALAR CHEEK
LOCATION DETAILED: RIGHT SUPERIOR HELIX
LOCATION DETAILED: RIGHT MEDIAL MALAR CHEEK
LOCATION DETAILED: RIGHT SUPERIOR FOREHEAD
LOCATION DETAILED: LEFT SUPERIOR FOREHEAD
LOCATION DETAILED: LEFT CENTRAL FRONTAL SCALP
LOCATION DETAILED: RIGHT FOREHEAD
LOCATION DETAILED: RIGHT CENTRAL MALAR CHEEK
LOCATION DETAILED: LEFT SUPERIOR MEDIAL FOREHEAD
LOCATION DETAILED: LEFT SUPERIOR LATERAL MALAR CHEEK

## 2019-07-11 ASSESSMENT — LOCATION ZONE DERM
LOCATION ZONE: SCALP
LOCATION ZONE: FACE
LOCATION ZONE: EAR

## 2019-07-11 ASSESSMENT — LOCATION SIMPLE DESCRIPTION DERM
LOCATION SIMPLE: LEFT FOREHEAD
LOCATION SIMPLE: LEFT SCALP
LOCATION SIMPLE: RIGHT EAR
LOCATION SIMPLE: RIGHT FOREHEAD
LOCATION SIMPLE: LEFT TEMPLE
LOCATION SIMPLE: RIGHT CHEEK
LOCATION SIMPLE: LEFT CHEEK

## 2019-07-11 NOTE — PROCEDURE: MIPS QUALITY
Quality 130: Documentation Of Current Medications In The Medical Record: Current Medications Documented
Quality 111:Pneumonia Vaccination Status For Older Adults: Pneumococcal Vaccination Previously Received
Quality 226: Preventive Care And Screening: Tobacco Use: Screening And Cessation Intervention: Patient screened for tobacco use and is an ex/non-smoker
Quality 47: Advance Care Plan: Advance Care Planning discussed and documented; advance care plan or surrogate decision maker documented in the medical record.
Quality 431: Preventive Care And Screening: Unhealthy Alcohol Use - Screening: Patient screened for unhealthy alcohol use using a single question and scores less than 2 times per year
Detail Level: Detailed

## 2019-07-11 NOTE — PROCEDURE: LIQUID NITROGEN
Consent: The patient's consent was obtained including but not limited to risks of crusting, scabbing, blistering, scarring, darker or lighter pigmentary change, recurrence, incomplete removal and infection.
Detail Level: Detailed
Number Of Freeze-Thaw Cycles: 3 freeze-thaw cycles
Render Post-Care Instructions In Note?: no
Duration Of Freeze Thaw-Cycle (Seconds): 3
Post-Care Instructions: I reviewed with the patient in detail post-care instructions. Patient is to wear sunprotection, and avoid picking at any of the treated lesions. Pt may apply Vaseline to crusted or scabbing areas.

## 2019-08-27 ENCOUNTER — APPOINTMENT (RX ONLY)
Dept: URBAN - METROPOLITAN AREA CLINIC 52 | Facility: CLINIC | Age: 79
Setting detail: DERMATOLOGY
End: 2019-08-27

## 2019-08-27 DIAGNOSIS — L57.8 OTHER SKIN CHANGES DUE TO CHRONIC EXPOSURE TO NONIONIZING RADIATION: ICD-10-CM

## 2019-08-27 DIAGNOSIS — L81.0 POSTINFLAMMATORY HYPERPIGMENTATION: ICD-10-CM

## 2019-08-27 DIAGNOSIS — L57.0 ACTINIC KERATOSIS: ICD-10-CM

## 2019-08-27 PROCEDURE — 17003 DESTRUCT PREMALG LES 2-14: CPT

## 2019-08-27 PROCEDURE — 17000 DESTRUCT PREMALG LESION: CPT

## 2019-08-27 PROCEDURE — ? LIQUID NITROGEN

## 2019-08-27 PROCEDURE — ? COUNSELING

## 2019-08-27 PROCEDURE — 99213 OFFICE O/P EST LOW 20 MIN: CPT | Mod: 25

## 2019-08-27 ASSESSMENT — LOCATION DETAILED DESCRIPTION DERM
LOCATION DETAILED: LEFT SUPERIOR MEDIAL FOREHEAD
LOCATION DETAILED: LEFT CENTRAL PARIETAL SCALP
LOCATION DETAILED: LEFT SUPERIOR FOREHEAD
LOCATION DETAILED: MID-FRONTAL SCALP
LOCATION DETAILED: RIGHT SUPERIOR FOREHEAD

## 2019-08-27 ASSESSMENT — LOCATION SIMPLE DESCRIPTION DERM
LOCATION SIMPLE: ANTERIOR SCALP
LOCATION SIMPLE: SCALP
LOCATION SIMPLE: RIGHT FOREHEAD
LOCATION SIMPLE: LEFT FOREHEAD

## 2019-08-27 ASSESSMENT — LOCATION ZONE DERM
LOCATION ZONE: FACE
LOCATION ZONE: SCALP

## 2019-08-27 NOTE — PROCEDURE: MIPS QUALITY
Quality 431: Preventive Care And Screening: Unhealthy Alcohol Use - Screening: Patient screened for unhealthy alcohol use using a single question and scores less than 2 times per year
Quality 111:Pneumonia Vaccination Status For Older Adults: Pneumococcal Vaccination Previously Received
Quality 226: Preventive Care And Screening: Tobacco Use: Screening And Cessation Intervention: Patient screened for tobacco use and is an ex/non-smoker
Quality 130: Documentation Of Current Medications In The Medical Record: Current Medications Documented
Detail Level: Detailed
Quality 47: Advance Care Plan: Advance Care Planning discussed and documented; advance care plan or surrogate decision maker documented in the medical record.

## 2019-10-14 ENCOUNTER — APPOINTMENT (RX ONLY)
Dept: URBAN - METROPOLITAN AREA CLINIC 52 | Facility: CLINIC | Age: 79
Setting detail: DERMATOLOGY
End: 2019-10-14

## 2019-10-14 DIAGNOSIS — L57.0 ACTINIC KERATOSIS: ICD-10-CM

## 2019-10-14 DIAGNOSIS — L81.0 POSTINFLAMMATORY HYPERPIGMENTATION: ICD-10-CM

## 2019-10-14 DIAGNOSIS — L57.8 OTHER SKIN CHANGES DUE TO CHRONIC EXPOSURE TO NONIONIZING RADIATION: ICD-10-CM

## 2019-10-14 PROCEDURE — ? COUNSELING

## 2019-10-14 PROCEDURE — 17003 DESTRUCT PREMALG LES 2-14: CPT

## 2019-10-14 PROCEDURE — 17000 DESTRUCT PREMALG LESION: CPT

## 2019-10-14 PROCEDURE — 99213 OFFICE O/P EST LOW 20 MIN: CPT | Mod: 25

## 2019-10-14 PROCEDURE — ? LIQUID NITROGEN

## 2019-10-14 ASSESSMENT — LOCATION ZONE DERM
LOCATION ZONE: SCALP
LOCATION ZONE: FACE

## 2019-10-14 ASSESSMENT — LOCATION DETAILED DESCRIPTION DERM
LOCATION DETAILED: LEFT SUPERIOR FOREHEAD
LOCATION DETAILED: LEFT CENTRAL PARIETAL SCALP
LOCATION DETAILED: RIGHT CENTRAL PARIETAL SCALP
LOCATION DETAILED: LEFT MEDIAL FRONTAL SCALP

## 2019-10-14 ASSESSMENT — LOCATION SIMPLE DESCRIPTION DERM
LOCATION SIMPLE: SCALP
LOCATION SIMPLE: LEFT SCALP
LOCATION SIMPLE: LEFT FOREHEAD

## 2019-10-14 NOTE — PROCEDURE: MIPS QUALITY
Detail Level: Detailed
Quality 47: Advance Care Plan: Advance Care Planning discussed and documented; advance care plan or surrogate decision maker documented in the medical record.
Quality 111:Pneumonia Vaccination Status For Older Adults: Pneumococcal Vaccination Previously Received
Quality 226: Preventive Care And Screening: Tobacco Use: Screening And Cessation Intervention: Patient screened for tobacco use and is an ex/non-smoker
Quality 431: Preventive Care And Screening: Unhealthy Alcohol Use - Screening: Patient screened for unhealthy alcohol use using a single question and scores less than 2 times per year
Quality 130: Documentation Of Current Medications In The Medical Record: Current Medications Documented

## 2019-11-05 ENCOUNTER — APPOINTMENT (RX ONLY)
Dept: URBAN - METROPOLITAN AREA CLINIC 52 | Facility: CLINIC | Age: 79
Setting detail: DERMATOLOGY
End: 2019-11-05

## 2019-11-05 DIAGNOSIS — L57.8 OTHER SKIN CHANGES DUE TO CHRONIC EXPOSURE TO NONIONIZING RADIATION: ICD-10-CM

## 2019-11-05 DIAGNOSIS — L57.0 ACTINIC KERATOSIS: ICD-10-CM

## 2019-11-05 DIAGNOSIS — L81.0 POSTINFLAMMATORY HYPERPIGMENTATION: ICD-10-CM

## 2019-11-05 PROCEDURE — 17000 DESTRUCT PREMALG LESION: CPT

## 2019-11-05 PROCEDURE — ? LIQUID NITROGEN

## 2019-11-05 PROCEDURE — 99213 OFFICE O/P EST LOW 20 MIN: CPT | Mod: 25

## 2019-11-05 PROCEDURE — ? PRESCRIPTION

## 2019-11-05 PROCEDURE — ? COUNSELING

## 2019-11-05 RX ORDER — DOXYCYCLINE HYCLATE 100 MG/1
1 CAPSULE, GELATIN COATED ORAL BID
Qty: 14 | Refills: 0 | Status: ERX | COMMUNITY
Start: 2019-11-05

## 2019-11-05 RX ORDER — MUPIROCIN 20 MG/G
OINTMENT TOPICAL
Qty: 1 | Refills: 1 | Status: ERX | COMMUNITY
Start: 2019-11-05

## 2019-11-05 RX ADMIN — DOXYCYCLINE HYCLATE 1: 100 CAPSULE, GELATIN COATED ORAL at 00:00

## 2019-11-05 RX ADMIN — MUPIROCIN 1: 20 OINTMENT TOPICAL at 00:00

## 2019-11-05 ASSESSMENT — LOCATION ZONE DERM
LOCATION ZONE: SCALP
LOCATION ZONE: LEG

## 2019-11-05 ASSESSMENT — LOCATION DETAILED DESCRIPTION DERM
LOCATION DETAILED: RIGHT MEDIAL DISTAL PRETIBIAL REGION
LOCATION DETAILED: LEFT MEDIAL FRONTAL SCALP

## 2019-11-05 ASSESSMENT — LOCATION SIMPLE DESCRIPTION DERM
LOCATION SIMPLE: RIGHT PRETIBIAL REGION
LOCATION SIMPLE: LEFT SCALP

## 2019-11-05 NOTE — PROCEDURE: LIQUID NITROGEN
Consent: The patient's consent was obtained including but not limited to risks of crusting, scabbing, blistering, scarring, darker or lighter pigmentary change, recurrence, incomplete removal and infection.
Render Post-Care Instructions In Note?: no
Detail Level: Simple
Post-Care Instructions: I reviewed with the patient in detail post-care instructions. Patient is to wear sunprotection, and avoid picking at any of the treated lesions. Pt may apply Vaseline to crusted or scabbing areas.
Number Of Freeze-Thaw Cycles: 3 freeze-thaw cycles
Duration Of Freeze Thaw-Cycle (Seconds): 3

## 2019-11-05 NOTE — PROCEDURE: MIPS QUALITY
Quality 431: Preventive Care And Screening: Unhealthy Alcohol Use - Screening: Patient screened for unhealthy alcohol use using a single question and scores less than 2 times per year
Quality 111:Pneumonia Vaccination Status For Older Adults: Pneumococcal Vaccination Previously Received
Quality 226: Preventive Care And Screening: Tobacco Use: Screening And Cessation Intervention: Patient screened for tobacco use and is an ex/non-smoker
Quality 130: Documentation Of Current Medications In The Medical Record: Current Medications Documented
Quality 47: Advance Care Plan: Advance Care Planning discussed and documented; advance care plan or surrogate decision maker documented in the medical record.
Detail Level: Detailed

## 2019-12-03 ENCOUNTER — APPOINTMENT (RX ONLY)
Dept: URBAN - METROPOLITAN AREA CLINIC 52 | Facility: CLINIC | Age: 79
Setting detail: DERMATOLOGY
End: 2019-12-03

## 2019-12-03 DIAGNOSIS — L57.0 ACTINIC KERATOSIS: ICD-10-CM

## 2019-12-03 DIAGNOSIS — L57.8 OTHER SKIN CHANGES DUE TO CHRONIC EXPOSURE TO NONIONIZING RADIATION: ICD-10-CM

## 2019-12-03 DIAGNOSIS — L81.0 POSTINFLAMMATORY HYPERPIGMENTATION: ICD-10-CM

## 2019-12-03 PROCEDURE — 99213 OFFICE O/P EST LOW 20 MIN: CPT | Mod: 25

## 2019-12-03 PROCEDURE — 17000 DESTRUCT PREMALG LESION: CPT

## 2019-12-03 PROCEDURE — ? LIQUID NITROGEN

## 2019-12-03 PROCEDURE — ? COUNSELING

## 2019-12-03 PROCEDURE — ? MEDICARE ABN

## 2019-12-03 PROCEDURE — 17003 DESTRUCT PREMALG LES 2-14: CPT

## 2019-12-03 ASSESSMENT — LOCATION DETAILED DESCRIPTION DERM
LOCATION DETAILED: LEFT SUPERIOR PARIETAL SCALP
LOCATION DETAILED: LEFT MEDIAL FRONTAL SCALP
LOCATION DETAILED: RIGHT DISTAL PRETIBIAL REGION
LOCATION DETAILED: RIGHT SUPERIOR HELIX
LOCATION DETAILED: LEFT SUPERIOR HELIX

## 2019-12-03 ASSESSMENT — LOCATION ZONE DERM
LOCATION ZONE: LEG
LOCATION ZONE: SCALP
LOCATION ZONE: EAR

## 2019-12-03 ASSESSMENT — LOCATION SIMPLE DESCRIPTION DERM
LOCATION SIMPLE: RIGHT PRETIBIAL REGION
LOCATION SIMPLE: LEFT EAR
LOCATION SIMPLE: RIGHT EAR
LOCATION SIMPLE: SCALP
LOCATION SIMPLE: LEFT SCALP

## 2019-12-03 NOTE — PROCEDURE: LIQUID NITROGEN
Render Note In Bullet Format When Appropriate: No
Number Of Freeze-Thaw Cycles: 3 freeze-thaw cycles
Post-Care Instructions: I reviewed with the patient in detail post-care instructions. Patient is to wear sunprotection, and avoid picking at any of the treated lesions. Pt may apply Vaseline to crusted or scabbing areas.
Duration Of Freeze Thaw-Cycle (Seconds): 3
Detail Level: Detailed
Consent: The patient's consent was obtained including but not limited to risks of crusting, scabbing, blistering, scarring, darker or lighter pigmentary change, recurrence, incomplete removal and infection.

## 2019-12-03 NOTE — PROCEDURE: MIPS QUALITY
Quality 431: Preventive Care And Screening: Unhealthy Alcohol Use - Screening: Patient screened for unhealthy alcohol use using a single question and scores less than 2 times per year
Detail Level: Detailed
Quality 130: Documentation Of Current Medications In The Medical Record: Current Medications Documented
Quality 111:Pneumonia Vaccination Status For Older Adults: Pneumococcal Vaccination Previously Received
Quality 47: Advance Care Plan: Advance Care Planning discussed and documented; advance care plan or surrogate decision maker documented in the medical record.
Quality 226: Preventive Care And Screening: Tobacco Use: Screening And Cessation Intervention: Patient screened for tobacco use and is an ex/non-smoker

## 2019-12-10 ENCOUNTER — APPOINTMENT (RX ONLY)
Dept: URBAN - METROPOLITAN AREA CLINIC 52 | Facility: CLINIC | Age: 79
Setting detail: DERMATOLOGY
End: 2019-12-10

## 2019-12-10 DIAGNOSIS — Z48.817 ENCOUNTER FOR SURGICAL AFTERCARE FOLLOWING SURGERY ON THE SKIN AND SUBCUTANEOUS TISSUE: ICD-10-CM

## 2019-12-10 PROCEDURE — ? POST-OP WOUND CHECK

## 2019-12-10 PROCEDURE — 99024 POSTOP FOLLOW-UP VISIT: CPT

## 2019-12-10 ASSESSMENT — LOCATION ZONE DERM: LOCATION ZONE: LEG

## 2019-12-10 ASSESSMENT — LOCATION SIMPLE DESCRIPTION DERM: LOCATION SIMPLE: RIGHT PRETIBIAL REGION

## 2019-12-10 ASSESSMENT — LOCATION DETAILED DESCRIPTION DERM: LOCATION DETAILED: RIGHT MEDIAL DISTAL PRETIBIAL REGION

## 2019-12-10 NOTE — PROCEDURE: POST-OP WOUND CHECK
Wound Dressing Override (Optional): bandaid
Detail Level: Simple
Add 85904 Cpt? (Important Note: In 2017 The Use Of 91555 Is Being Tracked By Cms To Determine Future Global Period Reimbursement For Global Periods): yes

## 2020-03-14 NOTE — RADRPT
EXAM DATE/TIME:  12/08/2017 09:57 

 

[facility] COMPARISON:     

No previous studies available for comparison.

 

                     

INDICATIONS :     

Pain post fall.

                     

 

MEDICAL HISTORY :     

Myocardial infarction.  Gastroesophageal reflux disease.  Hypercholesterolemia.      

 

SURGICAL HISTORY :     

Total knee replacement, right. Tonsillectomy. CABG. 

 

ENCOUNTER:     

Subsequent                                        

 

ACUITY:     

1 day      

 

PAIN SCORE:     

3/10

 

LOCATION:       

Upper mid back.

 

FINDINGS:     

The cardiac silhouette is enlarged in transverse diameter. The lungs are free of acute parenchymal op
acity. No effusions are identified. There is no  evidence of pneumothorax. Median sternotomy wires ar
e present. There is no evidence of acute fracture. Bony mineralization is normal.

 

CONCLUSION:     

1. There is no evidence of acute fracture.  

 

 

 

 Gigi Eastman MD on December 08, 2017 at 11:12           

Board Certified Radiologist.

 This report was verified electronically.
EXAM DATE/TIME:  12/10/2017 21:53 

 

[facility] COMPARISON:     

No previous studies available for comparison.

 

 

INDICATIONS :     

Abdominal pain with possible GI bleed.

                      

 

IV CONTRAST:     

75 cc Omnipaque 350 (iohexol) IV 

 

 

ORAL CONTRAST:      

Prescribed oral contrast ingested.

                      

 

RADIATION DOSE:     

7.85 CTDIvol (mGy) 

 

 

MEDICAL HISTORY :     

Cardiovascular disease. Hypertension. Gastroesophageal reflux disease.

 

SURGICAL HISTORY :      

CABG 

 

ENCOUNTER:      

Initial

 

ACUITY:      

1 day

 

PAIN SCALE:      

7/10

 

LOCATION:         

abdomen

 

TECHNIQUE:     

Volumetric scanning of the abdomen and pelvis was performed.  Using automated exposure control and ad
justment of the mA and/or kV according to patient size, radiation dose was kept as low as reasonably 
achievable to obtain optimal diagnostic quality images.  DICOM format image data is available electro
nically for review and comparison.  

 

FINDINGS:     

 

LOWER LUNGS:     

There are mild bilateral pleural effusions being greater on the right. There is accompanying atelecta
sis or consolidation at the right lung base. The patient is status post sternotomy.

 

LIVER:     

Homogeneous density without lesion.  There is no dilation of the biliary tree. The gallbladder is dis
tended. The gallbladder wall appears thickened. The common bile duct is distended at 1.2 cm.

 

SPLEEN:     

Normal size without lesion.

 

PANCREAS:     

Within normal limits.

 

KIDNEYS:     

Normal in size and shape.  There is no mass, stone or hydronephrosis. There is mild nonspecific perin
ephric stranding seen bilaterally.

 

ADRENAL GLANDS:     

Within normal limits.

 

VASCULAR:     

There is no aortic aneurysm. Calcifications are seen throughout the arterial system.

 

BOWEL/MESENTERY:     

There are scattered colonic diverticula. Significant bowel thickening or distention is not seen.

 

ABDOMINAL WALL:     

Within normal limits.

 

RETROPERITONEUM:     

There is no lymphadenopathy. 

 

BLADDER:     

No wall thickening or mass. 

 

REPRODUCTIVE:     

Prostatic calcifications are present.

 

INGUINAL:     

There is no lymphadenopathy or hernia. 

 

MUSCULOSKELETAL:     

There is degenerative change at the lumbar spine and at the hips.

 

CONCLUSION:     

1. Distended gallbladder with thickening of the gallbladder wall. Skin be correlated with any clinica
l signs of cholecystitis. The common bile duct is dilated at 1.2 cm. Calcified gallstones are not see
n.

2. Mild bilateral pleural effusions. There is some consolidation or atelectasis at the right lung bas
e.

3. Scattered colonic diverticula.

 

 

 

 Rickey Islas MD on December 10, 2017 at 22:26           

Board Certified Radiologist.

 This report was verified electronically.
EXAM DATE/TIME:  12/11/2017 17:25 

 

[facility] COMPARISON:     

No previous studies available for comparison.

       

 

 

INDICATIONS :     

Pain.

                     

 

MEDICAL HISTORY :           

Cardiovascular disease. Hypertension. Gastroesophageal reflux disease.

 

SURGICAL HISTORY :     

CABG Fusion, cervical. Tonsillectomy. Right knee.

 

ENCOUNTER:     

Subsequent

 

ACUITY:     

1 day

 

PAIN SCORE:     

5/10

 

LOCATION:         

Abdomen.

 

TECHNIQUE:     

Multiplanar, multisequence magnetic resonance imaging of the abdomen was performed.  High-resolution 
3D dataset was utilized to reconstruct maximum-intensity projection (MIP) images.

 

FINDINGS:     

There does appear to be a small stone in distal common duct measuring about 3 mm in diameter. The com
mon bile duct is dilated to approximately 13 mm. No gallstones identified within the gallbladder. The
re is mild gallbladder wall thickening and mild pericholecystic fluid. There is also some anasarca an
d fluid in the soft tissues. Bilateral pleural effusions also present with basilar lung consolidation
. No acute findings in the visualized liver, spleen, adrenals, kidneys or pancreas.

 

CONCLUSION:     

1. Probable small 3 mm stone in distal common duct. Common bile duct is dilated to 13 mm. There is al
so gallbladder wall thickening and some mild pericholecystic fluid.

2. Mild anasarca.

3. Small bilateral pleural effusions and basilar lung consolidation.

 

 

 

 Alfredo Scott MD on December 11, 2017 at 18:45           

Board Certified Radiologist.

 This report was verified electronically.
EXAM DATE/TIME:  12/13/2017 17:47 

 

[facility] COMPARISON:     

No previous studies available for comparison.

                     

 

INDICATIONS :     

Evaluation for stone in common bile duct. 

                     

 

FLUORO TIME:      

2 mins 16 secs 

 

IMAGE COUNT:       

6

                     

 

CONTRAST:     

Instilled by Ordering Physician

                     

 

MEDICAL HISTORY :     

None.          

 

SURGICAL HISTORY :     

None.   

 

ENCOUNTER:     

Initial                                        

 

ACUITY:     

1 day      

 

PAIN SCORE:     

Non-responsive.

 

LOCATION:       

ERCP 

 

FINDINGS:     

An ERCP was performed by the ordering physician.  

 

The images demonstrate cannulation of common bile duct and injection of contrast. No definite filling
 defects identified.

 

CONCLUSION:     ERCP as above.

 

 

 

 Alfredo Scott MD on December 13, 2017 at 18:50           

Board Certified Radiologist.

 This report was verified electronically.
EXAM DATE/TIME:  2017 14:32 

 

[facility] COMPARISON:     

No previous studies available for comparison.

 

 

INDICATIONS :      

Blood in stool with abdominal pain, nausea and vomiting.

                       

 

DOSE:      

20.7 mCi Tc99m Ultratag labeled red blood cells IV 

                       

 

IMAGIN hrs 

                       

 

MEDICAL HISTORY :     

Myocardial infarction. Gastroesophageal reflux disease. Hypercholesterolemia. 

 

SURGICAL HISTORY :      

Total knee replacement, right.  Tonsillectomy. CABG 

 

ENCOUNTER:     

Initial

 

ACUITY:     

3 days

 

PAIN SCALE:     

3/10

 

LOCATION:        

Abdomen.

 

TECHNIQUE:     

Following the modified in vitro labeling of autologous red cells, dynamic continuous images were acqu
ired for the specified interval.

 

FINDINGS:     

 

BIODISTRIBUTION:     

There is a very good labeling of red cells without significant uptake in the gastric wall.  There is 
good delineation of the blood pool of the spleen and abdominal vessels.

 

BLEEDING:     

No episodes of active GI bleeding are observed during specified interval of continuous observation.

 

CONCLUSION:     

1. Negative gastrointestinal bleeding scan

 

 

 

 Gigi Eastman MD on 2017 at 17:03           

Board Certified Radiologist.

 This report was verified electronically.
Less than Monthly

## 2023-02-13 NOTE — HHI.PR
Subjective


Remarks


bleeding persists   hgb now 7.5  will transfuse and have colonoscopy today   

will consult colorectal as backup shoule operative intervention be required





Objective





Vital Signs








  Date Time  Temp Pulse Resp B/P (MAP) Pulse Ox O2 Delivery O2 Flow Rate FiO2


 


12/9/17 03:27 96.9 98 18 110/55 (73) 94   


 


12/9/17 00:00 100.6 115 18 142/65 (90) 93   


 


12/8/17 23:05  100      


 


12/8/17 19:20 96.7 89 18 152/67 (95) 95   


 


12/8/17 16:00 96.1 97 18 140/66 (90) 94   


 


12/8/17 12:00 96.7 63 18 103/50 (67) 96   














I/O      


 


 12/8/17 12/8/17 12/8/17 12/9/17 12/9/17 12/9/17





 07:00 15:00 23:00 07:00 15:00 23:00


 


Intake Total 740 ml 480 ml 720 ml 0 ml  


 


Balance 740 ml 480 ml 720 ml 0 ml  


 


      


 


Intake Oral 240 ml 480 ml 720 ml 0 ml  


 


IV Total 500 ml     


 


# Voids 1 1 3 4  


 


# Bowel Movements 0 1 2 6  








Result Diagram:  


12/9/17 0557                                                                   

             12/8/17 0650





Imaging





Last Impressions








Ribs X-Ray 12/8/17 0000 Signed





Impressions: 





 Service Date/Time:  Friday, December 8, 2017 09:57 - CONCLUSION:  1. There is 

no 





 evidence of acute fracture.       Gigi Eastman MD 


 


GI Bleed Scan Nuclear Medicine 12/6/17 0000 Signed





Impressions: 





 Service Date/Time:  Wednesday, December 6, 2017 14:32 - CONCLUSION:  1. 

Negative 





 gastrointestinal bleeding scan     Gigi Eastman MD 








Objective Remarks


GENERAL: Well-nourished, well-developed patient.


SKIN: Warm and dry.


HEAD: Normocephalic.


EYES: No scleral icterus. No injection or drainage. 


NECK: Supple, trachea midline. No JVD or lymphadenopathy.


CARDIOVASCULAR: Regular rate and rhythm without murmurs, gallops, or rubs. 


RESPIRATORY: Breath sounds equal bilaterally. No accessory muscle use.


GASTROINTESTINAL: Abdomen soft, generalized tenderness espc lower mabdomen 

nondistended. 


EXTREMITIES: No cyanosis, or edema. 


NEUROLOGICAL: Awake, alert, and oriented x 3. Non-focal.


Medications and IVs





Inpatient Medications


Acetaminophen (Tylenol) 650 mg Q4H  PRN PO HEADACHE Last administered on 12/9/ 17at 00:29;  Start 12/6/17 at 13:15


Acetaminophen/ Hydrocodone Bitart (Norco  5-325 Mg) 1 tab Q4H  PRN PO pain 1-10 

Last administered on 12/8/17at 13:37;  Start 12/8/17 at 07:00


Amlodipine Besylate (Norvasc) 10 mg DAILY PO  Last administered on 12/9/17at 08:

26;  Start 12/6/17 at 11:45


Carvedilol (Coreg) 6.25 mg TID PO  Last administered on 12/9/17at 08:27;  Start 

12/6/17 at 09:00


Clonidine (Catapres) 0.1 mg Q6H  PRN PO SBP> OR = 180, DBP> OR = 100 Last 

administered on 12/6/17at 12:01;  Start 12/6/17 at 11:45


EZETIMIBE (Zetia) 10 mg HS PO  Last administered on 12/8/17at 20:03;  Start 12/6 /17 at 21:00


Flumazenil (Romazicon Inj) 0.2 mg Q1M  PRN IV PUSH OVERSEDATION;  Start 12/6/17 

at 17:30;  Stop 12/6/17 at 17:34;  Status DC


Hyoscyamine Sulfate (Levsin) 0.125 mg Q6H  PRN SL ABDOMINAL CRAMPING;  Start 12/ 7/17 at 19:45


Lansoprazole (Prevacid Odt) 30 mg HS@2000 PO  Last administered on 12/8/17at 20:

03;  Start 12/7/17 at 20:00


Naloxone HCl (Narcan Inj) 0.1 mg Q2M  PRN IV PUSH OVERSEDATION;  Start 12/6/17 

at 17:30;  Stop 12/6/17 at 17:34;  Status DC


Octreotide Acetate 500 mcg/ Sodium Chloride 500 ml @  50 mls/hr Q10H IV  Last 

administered on 12/9/17at 05:24;  Start 12/8/17 at 19:00


Octreotide Acetate (SandoSTATIN INJ) 50 mcg ONCE  ONCE IV PUSH  Last 

administered on 12/6/17at 02:17;  Start 12/6/17 at 00:30;  Stop 12/6/17 at 00:35

;  Status DC


Pantoprazole Sodium (Protonix) 40 mg DAILY PO  Last administered on 12/7/17at 09

:19;  Start 12/6/17 at 09:00;  Stop 12/7/17 at 19:30;  Status DC


Polyethylene Glycol/ Electrolytes (Colyte Liq) 4,000 ml ONCE  ONCE PO  Last 

administered on 12/8/17at 18:05;  Start 12/8/17 at 16:30;  Stop 12/8/17 at 16:31

;  Status DC


Pravastatin Sodium (Pravachol) 40 mg HS PO  Last administered on 12/8/17at 20:03

;  Start 12/6/17 at 21:00


Simethicone (Mylicon Chew) 80 mg Q4H  PRN PO GAS PAIN Last administered on 12/8/ 17at 18:05;  Start 12/7/17 at 20:15


Sodium Chloride (NS Flush) 2 ml BID IV FLUSH  Last administered on 12/9/17at 08:

27;  Start 12/6/17 at 09:00





Assessment and Plan


Problem List:  


(1) GI bleed


ICD Codes:  K92.2 - Gastrointestinal hemorrhage, unspecified


Plan:  transfuse and scope  repeat cbc am





Assessment and Plan


GI Bleedb   gi and colorectal consulted


Discussed Condition With


patient and family











Jose A Mendenhall DO Dec 9, 2017 08:43 Attending Only

## 2023-02-14 ENCOUNTER — APPOINTMENT (RX ONLY)
Dept: URBAN - METROPOLITAN AREA CLINIC 52 | Facility: CLINIC | Age: 83
Setting detail: DERMATOLOGY
End: 2023-02-14

## 2023-02-14 DIAGNOSIS — L82.0 INFLAMED SEBORRHEIC KERATOSIS: ICD-10-CM

## 2023-02-14 DIAGNOSIS — L81.4 OTHER MELANIN HYPERPIGMENTATION: ICD-10-CM | Status: STABLE

## 2023-02-14 DIAGNOSIS — L57.0 ACTINIC KERATOSIS: ICD-10-CM

## 2023-02-14 DIAGNOSIS — D22 MELANOCYTIC NEVI: ICD-10-CM | Status: STABLE

## 2023-02-14 DIAGNOSIS — D18.0 HEMANGIOMA: ICD-10-CM | Status: STABLE

## 2023-02-14 PROBLEM — D22.5 MELANOCYTIC NEVI OF TRUNK: Status: ACTIVE | Noted: 2023-02-14

## 2023-02-14 PROBLEM — D18.01 HEMANGIOMA OF SKIN AND SUBCUTANEOUS TISSUE: Status: ACTIVE | Noted: 2023-02-14

## 2023-02-14 PROCEDURE — ? LIQUID NITROGEN

## 2023-02-14 PROCEDURE — 17110 DESTRUCTION B9 LES UP TO 14: CPT

## 2023-02-14 PROCEDURE — ? FULL BODY SKIN EXAM

## 2023-02-14 PROCEDURE — 99203 OFFICE O/P NEW LOW 30 MIN: CPT | Mod: 25

## 2023-02-14 PROCEDURE — ? COUNSELING

## 2023-02-14 PROCEDURE — ? TREATMENT REGIMEN

## 2023-02-14 PROCEDURE — 17003 DESTRUCT PREMALG LES 2-14: CPT | Mod: 59

## 2023-02-14 PROCEDURE — 17000 DESTRUCT PREMALG LESION: CPT | Mod: 59

## 2023-02-14 ASSESSMENT — LOCATION SIMPLE DESCRIPTION DERM
LOCATION SIMPLE: LEFT EAR
LOCATION SIMPLE: LEFT UPPER ARM
LOCATION SIMPLE: ABDOMEN
LOCATION SIMPLE: LEFT CHEEK
LOCATION SIMPLE: NECK
LOCATION SIMPLE: RIGHT UPPER BACK
LOCATION SIMPLE: LEFT TEMPLE

## 2023-02-14 ASSESSMENT — LOCATION DETAILED DESCRIPTION DERM
LOCATION DETAILED: LEFT ANTERIOR PROXIMAL UPPER ARM
LOCATION DETAILED: LEFT CENTRAL LATERAL NECK
LOCATION DETAILED: PERIUMBILICAL SKIN
LOCATION DETAILED: LEFT CENTRAL TEMPLE
LOCATION DETAILED: LEFT SUPERIOR PREAURICULAR CHEEK
LOCATION DETAILED: LEFT TRAGUS
LOCATION DETAILED: RIGHT INFERIOR UPPER BACK
LOCATION DETAILED: LEFT SUPERIOR CRUS OF ANTIHELIX
LOCATION DETAILED: LEFT SUPERIOR HELIX

## 2023-02-14 ASSESSMENT — LOCATION ZONE DERM
LOCATION ZONE: NECK
LOCATION ZONE: FACE
LOCATION ZONE: TRUNK
LOCATION ZONE: EAR
LOCATION ZONE: ARM

## 2023-02-14 NOTE — PROCEDURE: LIQUID NITROGEN
Add 52 Modifier (Optional): no
Consent: The patient's consent was obtained including but not limited to risks of crusting, scabbing, blistering, scarring, darker or lighter pigmentary change, recurrence, incomplete removal and infection.
Detail Level: Detailed
Medical Necessity Information: It is in your best interest to select a reason for this procedure from the list below. All of these items fulfill various CMS LCD requirements except the new and changing color options.
Show Aperture Variable?: Yes
Spray Paint Text: The liquid nitrogen was applied to the skin utilizing a spray paint frosting technique.
Medical Necessity Clause: This procedure was medically necessary because the lesions that were treated were:inflamed
Post-Care Instructions: I reviewed with the patient in detail post-care instructions. Patient is to wear sunprotection, and avoid picking at any of the treated lesions. Pt may apply Vaseline to crusted or scabbing areas.
Number Of Freeze-Thaw Cycles: 3 freeze-thaw cycles
Detail Level: Zone
Duration Of Freeze Thaw-Cycle (Seconds): 3

## 2023-06-14 ENCOUNTER — APPOINTMENT (RX ONLY)
Dept: URBAN - METROPOLITAN AREA CLINIC 52 | Facility: CLINIC | Age: 83
Setting detail: DERMATOLOGY
End: 2023-06-14

## 2023-06-14 DIAGNOSIS — L57.0 ACTINIC KERATOSIS: ICD-10-CM

## 2023-06-14 DIAGNOSIS — Z85.828 PERSONAL HISTORY OF OTHER MALIGNANT NEOPLASM OF SKIN: ICD-10-CM

## 2023-06-14 DIAGNOSIS — L81.4 OTHER MELANIN HYPERPIGMENTATION: ICD-10-CM

## 2023-06-14 PROCEDURE — ? SUNSCREEN RECOMMENDATIONS

## 2023-06-14 PROCEDURE — ? COUNSELING

## 2023-06-14 PROCEDURE — ? FULL BODY SKIN EXAM - DECLINED

## 2023-06-14 PROCEDURE — ? LIQUID NITROGEN

## 2023-06-14 PROCEDURE — 99212 OFFICE O/P EST SF 10 MIN: CPT | Mod: 25

## 2023-06-14 PROCEDURE — 17004 DESTROY PREMAL LESIONS 15/>: CPT

## 2023-06-14 PROCEDURE — ? ADDITIONAL NOTES

## 2023-06-14 ASSESSMENT — LOCATION DETAILED DESCRIPTION DERM
LOCATION DETAILED: RIGHT CENTRAL TEMPLE
LOCATION DETAILED: RIGHT CENTRAL ZYGOMA
LOCATION DETAILED: RIGHT SUPERIOR PARIETAL SCALP
LOCATION DETAILED: RIGHT SUPERIOR MEDIAL FOREHEAD
LOCATION DETAILED: LEFT FOREHEAD
LOCATION DETAILED: LEFT CENTRAL ZYGOMA
LOCATION DETAILED: RIGHT FOREHEAD
LOCATION DETAILED: LEFT SUPERIOR LATERAL MALAR CHEEK
LOCATION DETAILED: LEFT SUPERIOR FRONTAL SCALP
LOCATION DETAILED: LEFT CENTRAL FRONTAL SCALP
LOCATION DETAILED: RIGHT SUPERIOR LATERAL MALAR CHEEK
LOCATION DETAILED: RIGHT SUPERIOR CENTRAL MALAR CHEEK
LOCATION DETAILED: LEFT MEDIAL FRONTAL SCALP
LOCATION DETAILED: LEFT SUPERIOR OCCIPITAL SCALP
LOCATION DETAILED: LEFT SUPERIOR PREAURICULAR CHEEK
LOCATION DETAILED: RIGHT MEDIAL FRONTAL SCALP

## 2023-06-14 ASSESSMENT — LOCATION SIMPLE DESCRIPTION DERM
LOCATION SIMPLE: SCALP
LOCATION SIMPLE: LEFT ZYGOMA
LOCATION SIMPLE: LEFT FOREHEAD
LOCATION SIMPLE: RIGHT ZYGOMA
LOCATION SIMPLE: LEFT CHEEK
LOCATION SIMPLE: RIGHT CHEEK
LOCATION SIMPLE: RIGHT TEMPLE
LOCATION SIMPLE: RIGHT FOREHEAD
LOCATION SIMPLE: RIGHT SCALP
LOCATION SIMPLE: LEFT OCCIPITAL SCALP
LOCATION SIMPLE: LEFT SCALP

## 2023-06-14 ASSESSMENT — LOCATION ZONE DERM
LOCATION ZONE: FACE
LOCATION ZONE: SCALP

## 2023-06-14 NOTE — PROCEDURE: ADDITIONAL NOTES
Detail Level: Zone
Render Risk Assessment In Note?: no
Additional Notes: Recommend IPL from our medical  Ellen
Additional Notes: If AKs do not resolve after LN2 treatment, a biopsy is recommended.

## 2023-06-14 NOTE — PROCEDURE: MIPS QUALITY
Quality 226: Preventive Care And Screening: Tobacco Use: Screening And Cessation Intervention: Patient screened for tobacco use and is an ex/non-smoker
Detail Level: Detailed
Quality 111:Pneumonia Vaccination Status For Older Adults: Patient received any pneumococcal conjugate or polysaccharide vaccine on or after their 60th birthday and before the end of the measurement period
Quality 130: Documentation Of Current Medications In The Medical Record: Current Medications Documented
Quality 431: Preventive Care And Screening: Unhealthy Alcohol Use - Screening: Patient not identified as an unhealthy alcohol user when screened for unhealthy alcohol use using a systematic screening method

## 2024-05-08 ENCOUNTER — APPOINTMENT (RX ONLY)
Dept: URBAN - METROPOLITAN AREA CLINIC 52 | Facility: CLINIC | Age: 84
Setting detail: DERMATOLOGY
End: 2024-05-08

## 2024-05-08 DIAGNOSIS — B00.1 HERPESVIRAL VESICULAR DERMATITIS: ICD-10-CM

## 2024-05-08 DIAGNOSIS — L57.0 ACTINIC KERATOSIS: ICD-10-CM

## 2024-05-08 PROBLEM — D48.5 NEOPLASM OF UNCERTAIN BEHAVIOR OF SKIN: Status: ACTIVE | Noted: 2024-05-08

## 2024-05-08 PROCEDURE — ? BIOPSY BY SHAVE METHOD

## 2024-05-08 PROCEDURE — 69100 BIOPSY OF EXTERNAL EAR: CPT | Mod: 59

## 2024-05-08 PROCEDURE — ? COUNSELING

## 2024-05-08 PROCEDURE — 99213 OFFICE O/P EST LOW 20 MIN: CPT | Mod: 25

## 2024-05-08 PROCEDURE — ? PRESCRIPTION MEDICATION MANAGEMENT

## 2024-05-08 PROCEDURE — ? PRESCRIPTION

## 2024-05-08 PROCEDURE — ? LIQUID NITROGEN

## 2024-05-08 PROCEDURE — 17003 DESTRUCT PREMALG LES 2-14: CPT

## 2024-05-08 PROCEDURE — 17000 DESTRUCT PREMALG LESION: CPT

## 2024-05-08 RX ORDER — VALACYCLOVIR HYDROCHLORIDE 1 G/1
TABLET, FILM COATED ORAL
Qty: 20 | Refills: 0 | Status: ERX | COMMUNITY
Start: 2024-05-08

## 2024-05-08 RX ADMIN — VALACYCLOVIR HYDROCHLORIDE: 1 TABLET, FILM COATED ORAL at 00:00

## 2024-05-08 ASSESSMENT — LOCATION SIMPLE DESCRIPTION DERM
LOCATION SIMPLE: LEFT 3RD TOE
LOCATION SIMPLE: LEFT SCALP
LOCATION SIMPLE: RIGHT SCALP

## 2024-05-08 ASSESSMENT — LOCATION ZONE DERM
LOCATION ZONE: TOE
LOCATION ZONE: SCALP

## 2024-05-08 ASSESSMENT — LOCATION DETAILED DESCRIPTION DERM
LOCATION DETAILED: LEFT DORSAL 3RD TOE
LOCATION DETAILED: RIGHT MEDIAL FRONTAL SCALP
LOCATION DETAILED: LEFT CENTRAL FRONTAL SCALP

## 2024-05-08 NOTE — PROCEDURE: PRESCRIPTION MEDICATION MANAGEMENT
Render In Strict Bullet Format?: No
Detail Level: Zone
Initiate Treatment: valacyclovir 1 gram tablet \\nQuantity: 4.0 Tablet  Days Supply: 30\\nSig: Take two po at onset of symptoms, repeat 12 hours later

## 2024-05-08 NOTE — PROCEDURE: LIQUID NITROGEN
Post-Care Instructions: I reviewed with the patient in detail post-care instructions. Patient is to wear sunprotection, and avoid picking at any of the treated lesions. Pt may apply Vaseline to crusted or scabbing areas.
Number Of Freeze-Thaw Cycles: 2 freeze-thaw cycles
Show Aperture Variable?: Yes
Detail Level: Simple
Render Post-Care Instructions In Note?: no
Duration Of Freeze Thaw-Cycle (Seconds): 2
Consent: The patient's consent was obtained including but not limited to risks of crusting, scabbing, blistering, scarring, darker or lighter pigmentary change, recurrence, incomplete removal and infection.
Application Tool (Optional): Liquid Nitrogen Sprayer

## 2024-05-08 NOTE — PROCEDURE: BIOPSY BY SHAVE METHOD
Detail Level: Detailed
Depth Of Biopsy: dermis
Was A Bandage Applied: Yes
Size Of Lesion In Cm: 0.7
X Size Of Lesion In Cm: 0
Biopsy Type: H and E
Biopsy Method: Dermablade
Anesthesia Type: 1% lidocaine with epinephrine
Anesthesia Volume In Cc: 0.5
Hemostasis: Maggie's
Wound Care: Aquaphor
Dressing: bandage
Destruction After The Procedure: No
Type Of Destruction Used: Curettage
Curettage Text: The wound bed was treated with curettage after the biopsy was performed.
Cryotherapy Text: The wound bed was treated with cryotherapy after the biopsy was performed.
Electrodesiccation Text: The wound bed was treated with electrodesiccation after the biopsy was performed.
Electrodesiccation And Curettage Text: The wound bed was treated with electrodesiccation and curettage after the biopsy was performed.
Silver Nitrate Text: The wound bed was treated with silver nitrate after the biopsy was performed.
Lab: -7
Lab Facility: [id number]
Consent: Written consent was obtained and risks were reviewed including but not limited to scarring, infection, bleeding, scabbing, incomplete removal, nerve damage and allergy to anesthesia.
Post-Care Instructions: I reviewed with the patient in detail post-care instructions. Patient is to keep the biopsy site dry overnight, and then apply bacitracin twice daily until healed. Patient may apply hydrogen peroxide soaks to remove any crusting.
Notification Instructions: Patient will be notified of biopsy results. However, patient instructed to call the office if not contacted within 2 weeks.
Billing Type: Third-Party Bill
Information: Selecting Yes will display possible errors in your note based on the variables you have selected. This validation is only offered as a suggestion for you. PLEASE NOTE THAT THE VALIDATION TEXT WILL BE REMOVED WHEN YOU FINALIZE YOUR NOTE. IF YOU WANT TO FAX A PRELIMINARY NOTE YOU WILL NEED TO TOGGLE THIS TO 'NO' IF YOU DO NOT WANT IT IN YOUR FAXED NOTE.

## 2024-05-29 ENCOUNTER — APPOINTMENT (RX ONLY)
Dept: URBAN - METROPOLITAN AREA CLINIC 52 | Facility: CLINIC | Age: 84
Setting detail: DERMATOLOGY
End: 2024-05-29

## 2024-05-29 DIAGNOSIS — B00.1 HERPESVIRAL VESICULAR DERMATITIS: ICD-10-CM

## 2024-05-29 PROBLEM — D04.22 CARCINOMA IN SITU OF SKIN OF LEFT EAR AND EXTERNAL AURICULAR CANAL: Status: ACTIVE | Noted: 2024-05-29

## 2024-05-29 PROCEDURE — ? EDUCATIONAL RESOURCES PROVIDED

## 2024-05-29 PROCEDURE — ? DEFER

## 2024-05-29 PROCEDURE — ? ADDITIONAL NOTES

## 2024-05-29 PROCEDURE — ? PHOTO-DOCUMENTATION

## 2024-05-29 PROCEDURE — 17000 DESTRUCT PREMALG LESION: CPT

## 2024-05-29 PROCEDURE — 99212 OFFICE O/P EST SF 10 MIN: CPT | Mod: 25

## 2024-05-29 PROCEDURE — ? COUNSELING

## 2024-05-29 PROCEDURE — ? LIQUID NITROGEN

## 2024-05-29 ASSESSMENT — LOCATION ZONE DERM: LOCATION ZONE: TOE

## 2024-05-29 ASSESSMENT — LOCATION DETAILED DESCRIPTION DERM: LOCATION DETAILED: LEFT DORSAL 3RD TOE

## 2024-05-29 ASSESSMENT — LOCATION SIMPLE DESCRIPTION DERM: LOCATION SIMPLE: LEFT 3RD TOE

## 2024-05-29 NOTE — PROCEDURE: ADDITIONAL NOTES
Render Risk Assessment In Note?: no
Additional Notes: Offered culture and biopsy to confirm diagnosis. Patient declined. Discussed that he may return for more testing. Patient has previously tried various topical steroids, anti-fungals, and antibiotics without relief. We discussed that further testing was warranted.
Detail Level: Simple

## 2024-05-29 NOTE — PROCEDURE: LIQUID NITROGEN
Show Aperture Variable?: Yes
Consent: The patient's consent was obtained including but not limited to risks of crusting, scabbing, blistering, scarring, darker or lighter pigmentary change, recurrence, incomplete removal and infection.
Application Tool (Optional): Liquid Nitrogen Sprayer
Duration Of Freeze Thaw-Cycle (Seconds): 2
Post-Care Instructions: I reviewed with the patient in detail post-care instructions. Patient is to wear sunprotection, and avoid picking at any of the treated lesions. Pt may apply Vaseline to crusted or scabbing areas.
Render Post-Care Instructions In Note?: no
Number Of Freeze-Thaw Cycles: 2 freeze-thaw cycles
Detail Level: Simple

## 2024-05-29 NOTE — PROCEDURE: DEFER
Detail Level: Detailed
X Size Of Lesion In Cm (Optional): 0
Instructions (Optional): Patient declined biopsy at today’s visit. Patient welcomed to come back for a biopsy if he changes his mind
Size Of Lesion In Cm (Optional): 1.5
Introduction Text (Please End With A Colon): The following procedure was deferred: